# Patient Record
Sex: FEMALE | Race: WHITE | Employment: FULL TIME | URBAN - METROPOLITAN AREA
[De-identification: names, ages, dates, MRNs, and addresses within clinical notes are randomized per-mention and may not be internally consistent; named-entity substitution may affect disease eponyms.]

---

## 2017-02-16 RX ORDER — ESCITALOPRAM OXALATE 10 MG/1
10 TABLET ORAL DAILY
Qty: 30 TABLET | Refills: 2 | Status: SHIPPED | OUTPATIENT
Start: 2017-02-16 | End: 2017-06-08 | Stop reason: ALTCHOICE

## 2017-04-26 ENCOUNTER — HOSPITAL ENCOUNTER (EMERGENCY)
Age: 37
Discharge: HOME | End: 2017-04-26
Attending: EMERGENCY MEDICINE
Payer: COMMERCIAL

## 2017-04-26 ENCOUNTER — APPOINTMENT (OUTPATIENT)
Dept: GENERAL RADIOLOGY | Age: 37
End: 2017-04-26
Payer: COMMERCIAL

## 2017-04-26 VITALS
WEIGHT: 172 LBS | BODY MASS INDEX: 27.64 KG/M2 | SYSTOLIC BLOOD PRESSURE: 120 MMHG | HEIGHT: 66 IN | OXYGEN SATURATION: 96 % | RESPIRATION RATE: 16 BRPM | DIASTOLIC BLOOD PRESSURE: 68 MMHG | HEART RATE: 88 BPM

## 2017-04-26 DIAGNOSIS — S80.12XA CONTUSION OF CALF, LEFT, INITIAL ENCOUNTER: Primary | ICD-10-CM

## 2017-04-26 PROCEDURE — 99283 EMERGENCY DEPT VISIT LOW MDM: CPT

## 2017-04-26 PROCEDURE — 73590 X-RAY EXAM OF LOWER LEG: CPT

## 2017-04-26 RX ORDER — IBUPROFEN 800 MG/1
800 TABLET ORAL EVERY 8 HOURS PRN
Qty: 20 TABLET | Refills: 0 | Status: SHIPPED | OUTPATIENT
Start: 2017-04-26 | End: 2017-06-08 | Stop reason: SDUPTHER

## 2017-04-26 ASSESSMENT — PAIN DESCRIPTION - FREQUENCY: FREQUENCY: INTERMITTENT

## 2017-04-26 ASSESSMENT — PAIN DESCRIPTION - ORIENTATION: ORIENTATION: LEFT;MID

## 2017-04-26 ASSESSMENT — PAIN DESCRIPTION - DESCRIPTORS: DESCRIPTORS: POUNDING;PRESSURE

## 2017-04-26 ASSESSMENT — PAIN SCALES - GENERAL: PAINLEVEL_OUTOF10: 2

## 2017-04-26 ASSESSMENT — PAIN DESCRIPTION - LOCATION: LOCATION: LEG

## 2017-05-08 ENCOUNTER — TELEPHONE (OUTPATIENT)
Dept: OBGYN CLINIC | Age: 37
End: 2017-05-08

## 2017-05-08 DIAGNOSIS — N92.0 MENORRHAGIA WITH REGULAR CYCLE: Primary | ICD-10-CM

## 2017-06-08 ENCOUNTER — OFFICE VISIT (OUTPATIENT)
Dept: INTERNAL MEDICINE CLINIC | Age: 37
End: 2017-06-08
Payer: COMMERCIAL

## 2017-06-08 ENCOUNTER — HOSPITAL ENCOUNTER (OUTPATIENT)
Age: 37
Setting detail: SPECIMEN
Discharge: HOME | End: 2017-06-08
Payer: COMMERCIAL

## 2017-06-08 VITALS
TEMPERATURE: 98.6 F | HEIGHT: 66 IN | RESPIRATION RATE: 16 BRPM | SYSTOLIC BLOOD PRESSURE: 90 MMHG | HEART RATE: 80 BPM | BODY MASS INDEX: 27.16 KG/M2 | DIASTOLIC BLOOD PRESSURE: 70 MMHG | WEIGHT: 169 LBS

## 2017-06-08 DIAGNOSIS — R61 SWEATING INCREASE: Primary | ICD-10-CM

## 2017-06-08 DIAGNOSIS — N64.4 BREAST PAIN, RIGHT: ICD-10-CM

## 2017-06-08 DIAGNOSIS — E04.1 THYROID NODULE: ICD-10-CM

## 2017-06-08 DIAGNOSIS — Z00.00 PHYSICAL EXAM: ICD-10-CM

## 2017-06-08 DIAGNOSIS — R14.0 ABDOMINAL BLOATING: ICD-10-CM

## 2017-06-08 DIAGNOSIS — F41.1 GAD (GENERALIZED ANXIETY DISORDER): ICD-10-CM

## 2017-06-08 DIAGNOSIS — R61 SWEATING INCREASE: ICD-10-CM

## 2017-06-08 LAB
-: ABNORMAL
ALBUMIN SERPL-MCNC: 4.7 G/DL (ref 3.5–5.2)
ALBUMIN/GLOBULIN RATIO: 2 (ref 1–2.5)
ALP BLD-CCNC: 52 U/L (ref 35–104)
ALT SERPL-CCNC: 14 U/L (ref 5–33)
AMORPHOUS: ABNORMAL
ANION GAP SERPL CALCULATED.3IONS-SCNC: 16 MMOL/L (ref 9–17)
AST SERPL-CCNC: 19 U/L
BACTERIA: ABNORMAL
BILIRUB SERPL-MCNC: 0.84 MG/DL (ref 0.3–1.2)
BILIRUBIN URINE: NEGATIVE
BUN BLDV-MCNC: 14 MG/DL (ref 6–20)
BUN/CREAT BLD: NORMAL (ref 9–20)
CALCIUM SERPL-MCNC: 9.8 MG/DL (ref 8.6–10.4)
CASTS UA: ABNORMAL /LPF (ref 0–2)
CHLORIDE BLD-SCNC: 106 MMOL/L (ref 98–107)
CO2: 22 MMOL/L (ref 20–31)
COLOR: YELLOW
COMMENT UA: ABNORMAL
CREAT SERPL-MCNC: 0.75 MG/DL (ref 0.5–0.9)
CRYSTALS, UA: ABNORMAL /HPF
EPITHELIAL CELLS UA: ABNORMAL /HPF (ref 0–5)
GFR AFRICAN AMERICAN: >60 ML/MIN
GFR NON-AFRICAN AMERICAN: >60 ML/MIN
GFR SERPL CREATININE-BSD FRML MDRD: NORMAL ML/MIN/{1.73_M2}
GFR SERPL CREATININE-BSD FRML MDRD: NORMAL ML/MIN/{1.73_M2}
GLUCOSE BLD-MCNC: 82 MG/DL (ref 70–99)
GLUCOSE URINE: NEGATIVE
HCT VFR BLD CALC: 41.5 % (ref 36–46)
HEMOGLOBIN: 13.9 G/DL (ref 12–16)
KETONES, URINE: ABNORMAL
LEUKOCYTE ESTERASE, URINE: NEGATIVE
MCH RBC QN AUTO: 32.5 PG (ref 26–34)
MCHC RBC AUTO-ENTMCNC: 33.6 G/DL (ref 31–37)
MCV RBC AUTO: 96.8 FL (ref 80–100)
MUCUS: ABNORMAL
NITRITE, URINE: POSITIVE
OTHER OBSERVATIONS UA: ABNORMAL
PDW BLD-RTO: 14.6 % (ref 12.5–15.4)
PH UA: 5 (ref 5–8)
PLATELET # BLD: 217 K/UL (ref 140–450)
PMV BLD AUTO: 8.7 FL (ref 6–12)
POTASSIUM SERPL-SCNC: 4.5 MMOL/L (ref 3.7–5.3)
PROTEIN UA: NEGATIVE
RBC # BLD: 4.29 M/UL (ref 4–5.2)
RBC UA: ABNORMAL /HPF (ref 0–2)
RENAL EPITHELIAL, UA: ABNORMAL /HPF
RHEUMATOID FACTOR: <10 IU/ML
SODIUM BLD-SCNC: 144 MMOL/L (ref 135–144)
SPECIFIC GRAVITY UA: 1.03 (ref 1–1.03)
THYROXINE, FREE: 1.08 NG/DL (ref 0.93–1.7)
TOTAL PROTEIN: 7.1 G/DL (ref 6.4–8.3)
TRICHOMONAS: ABNORMAL
TSH SERPL DL<=0.05 MIU/L-ACNC: 3.1 MIU/L (ref 0.3–5)
TURBIDITY: CLEAR
URINE HGB: ABNORMAL
UROBILINOGEN, URINE: NORMAL
VITAMIN B-12: 498 PG/ML (ref 211–946)
WBC # BLD: 9.6 K/UL (ref 3.5–11)
WBC UA: ABNORMAL /HPF (ref 0–5)
YEAST: ABNORMAL

## 2017-06-08 PROCEDURE — 99213 OFFICE O/P EST LOW 20 MIN: CPT | Performed by: INTERNAL MEDICINE

## 2017-06-08 RX ORDER — IBUPROFEN 800 MG/1
800 TABLET ORAL EVERY 8 HOURS PRN
Qty: 90 TABLET | Refills: 0 | Status: SHIPPED | OUTPATIENT
Start: 2017-06-08 | End: 2017-09-18 | Stop reason: SDUPTHER

## 2017-06-09 ENCOUNTER — TELEPHONE (OUTPATIENT)
Dept: INTERNAL MEDICINE CLINIC | Age: 37
End: 2017-06-09

## 2017-06-09 DIAGNOSIS — N39.0 URINARY TRACT INFECTION, SITE UNSPECIFIED: Primary | ICD-10-CM

## 2017-06-09 LAB — ANTI-NUCLEAR ANTIBODY (ANA): ABNORMAL

## 2017-06-09 RX ORDER — CIPROFLOXACIN 500 MG/1
500 TABLET, FILM COATED ORAL 2 TIMES DAILY
Qty: 14 TABLET | Refills: 0 | Status: SHIPPED | OUTPATIENT
Start: 2017-06-09 | End: 2017-06-16

## 2017-06-10 LAB
CULTURE: ABNORMAL
CULTURE: ABNORMAL
Lab: ABNORMAL
ORGANISM: ABNORMAL
SPECIMEN DESCRIPTION: ABNORMAL
STATUS: ABNORMAL

## 2017-09-18 ENCOUNTER — OFFICE VISIT (OUTPATIENT)
Dept: INTERNAL MEDICINE CLINIC | Age: 37
End: 2017-09-18
Payer: COMMERCIAL

## 2017-09-18 ENCOUNTER — HOSPITAL ENCOUNTER (OUTPATIENT)
Age: 37
Setting detail: SPECIMEN
Discharge: HOME | End: 2017-09-18
Payer: COMMERCIAL

## 2017-09-18 VITALS
WEIGHT: 172 LBS | TEMPERATURE: 98.4 F | BODY MASS INDEX: 27.64 KG/M2 | SYSTOLIC BLOOD PRESSURE: 100 MMHG | HEIGHT: 66 IN | RESPIRATION RATE: 16 BRPM | DIASTOLIC BLOOD PRESSURE: 76 MMHG | HEART RATE: 72 BPM

## 2017-09-18 DIAGNOSIS — L65.9 HAIR LOSS: ICD-10-CM

## 2017-09-18 DIAGNOSIS — R61 SWEATING INCREASE: Primary | ICD-10-CM

## 2017-09-18 DIAGNOSIS — R76.8 POSITIVE ANA (ANTINUCLEAR ANTIBODY): ICD-10-CM

## 2017-09-18 DIAGNOSIS — R61 SWEATING INCREASE: ICD-10-CM

## 2017-09-18 DIAGNOSIS — R39.11 URINARY HESITANCY: ICD-10-CM

## 2017-09-18 PROCEDURE — 99214 OFFICE O/P EST MOD 30 MIN: CPT | Performed by: INTERNAL MEDICINE

## 2017-09-18 RX ORDER — IBUPROFEN 800 MG/1
800 TABLET ORAL EVERY 8 HOURS PRN
Qty: 90 TABLET | Refills: 0 | Status: SHIPPED | OUTPATIENT
Start: 2017-09-18 | End: 2018-02-16 | Stop reason: SDUPTHER

## 2017-09-18 ASSESSMENT — PATIENT HEALTH QUESTIONNAIRE - PHQ9
SUM OF ALL RESPONSES TO PHQ9 QUESTIONS 1 & 2: 0
1. LITTLE INTEREST OR PLEASURE IN DOING THINGS: 0
SUM OF ALL RESPONSES TO PHQ QUESTIONS 1-9: 0
2. FEELING DOWN, DEPRESSED OR HOPELESS: 0

## 2017-09-19 LAB
ANTI-NUCLEAR ANTIBODY (ANA): NEGATIVE
CULTURE: NO GROWTH
CULTURE: NORMAL
Lab: NORMAL
SPECIMEN DESCRIPTION: NORMAL
STATUS: NORMAL

## 2018-02-16 RX ORDER — IBUPROFEN 800 MG/1
800 TABLET ORAL EVERY 8 HOURS PRN
Qty: 90 TABLET | Refills: 0 | Status: SHIPPED | OUTPATIENT
Start: 2018-02-16 | End: 2018-09-28 | Stop reason: ALTCHOICE

## 2018-09-28 ENCOUNTER — HOSPITAL ENCOUNTER (EMERGENCY)
Age: 38
Discharge: HOME | End: 2018-09-28
Attending: EMERGENCY MEDICINE
Payer: COMMERCIAL

## 2018-09-28 ENCOUNTER — OFFICE VISIT (OUTPATIENT)
Dept: INTERNAL MEDICINE CLINIC | Age: 38
End: 2018-09-28
Payer: COMMERCIAL

## 2018-09-28 ENCOUNTER — TELEPHONE (OUTPATIENT)
Dept: INTERNAL MEDICINE CLINIC | Age: 38
End: 2018-09-28

## 2018-09-28 VITALS
BODY MASS INDEX: 26.68 KG/M2 | SYSTOLIC BLOOD PRESSURE: 141 MMHG | WEIGHT: 166 LBS | HEART RATE: 82 BPM | TEMPERATURE: 98.2 F | OXYGEN SATURATION: 100 % | HEIGHT: 66 IN | DIASTOLIC BLOOD PRESSURE: 86 MMHG | RESPIRATION RATE: 16 BRPM

## 2018-09-28 VITALS
DIASTOLIC BLOOD PRESSURE: 88 MMHG | HEART RATE: 103 BPM | SYSTOLIC BLOOD PRESSURE: 130 MMHG | BODY MASS INDEX: 26.81 KG/M2 | OXYGEN SATURATION: 98 % | WEIGHT: 166.8 LBS | HEIGHT: 66 IN

## 2018-09-28 DIAGNOSIS — M54.42 ACUTE LEFT-SIDED LOW BACK PAIN WITH LEFT-SIDED SCIATICA: Primary | ICD-10-CM

## 2018-09-28 DIAGNOSIS — M79.2 NEUROPATHIC PAIN OF ANKLE, LEFT: Primary | ICD-10-CM

## 2018-09-28 PROCEDURE — 99283 EMERGENCY DEPT VISIT LOW MDM: CPT

## 2018-09-28 PROCEDURE — G8419 CALC BMI OUT NRM PARAM NOF/U: HCPCS | Performed by: NURSE PRACTITIONER

## 2018-09-28 PROCEDURE — 4004F PT TOBACCO SCREEN RCVD TLK: CPT | Performed by: NURSE PRACTITIONER

## 2018-09-28 PROCEDURE — 84703 CHORIONIC GONADOTROPIN ASSAY: CPT

## 2018-09-28 PROCEDURE — 81003 URINALYSIS AUTO W/O SCOPE: CPT

## 2018-09-28 PROCEDURE — G8427 DOCREV CUR MEDS BY ELIG CLIN: HCPCS | Performed by: NURSE PRACTITIONER

## 2018-09-28 PROCEDURE — 99214 OFFICE O/P EST MOD 30 MIN: CPT | Performed by: NURSE PRACTITIONER

## 2018-09-28 RX ORDER — METHYLPREDNISOLONE SODIUM SUCCINATE 125 MG/2ML
125 INJECTION, POWDER, LYOPHILIZED, FOR SOLUTION INTRAMUSCULAR; INTRAVENOUS ONCE
Status: COMPLETED | OUTPATIENT
Start: 2018-09-28 | End: 2018-09-28

## 2018-09-28 RX ORDER — PREDNISONE 10 MG/1
10 TABLET ORAL
Qty: 15 TABLET | Refills: 0 | Status: SHIPPED | OUTPATIENT
Start: 2018-09-28 | End: 2018-10-16 | Stop reason: SDUPTHER

## 2018-09-28 RX ORDER — GABAPENTIN 300 MG/1
300 CAPSULE ORAL 2 TIMES DAILY
Qty: 30 CAPSULE | Refills: 0 | Status: SHIPPED | OUTPATIENT
Start: 2018-09-28 | End: 2018-10-23 | Stop reason: ALTCHOICE

## 2018-09-28 RX ADMIN — METHYLPREDNISOLONE SODIUM SUCCINATE 125 MG: 125 INJECTION, POWDER, LYOPHILIZED, FOR SOLUTION INTRAMUSCULAR; INTRAVENOUS at 15:19

## 2018-09-28 ASSESSMENT — PATIENT HEALTH QUESTIONNAIRE - PHQ9
SUM OF ALL RESPONSES TO PHQ QUESTIONS 1-9: 0
SUM OF ALL RESPONSES TO PHQ QUESTIONS 1-9: 0
2. FEELING DOWN, DEPRESSED OR HOPELESS: 0
1. LITTLE INTEREST OR PLEASURE IN DOING THINGS: 0
SUM OF ALL RESPONSES TO PHQ9 QUESTIONS 1 & 2: 0

## 2018-09-28 ASSESSMENT — PAIN DESCRIPTION - LOCATION: LOCATION: BACK;ANKLE

## 2018-09-28 ASSESSMENT — PAIN SCALES - GENERAL: PAINLEVEL_OUTOF10: 6

## 2018-09-28 ASSESSMENT — PAIN DESCRIPTION - DESCRIPTORS: DESCRIPTORS: SHARP

## 2018-09-28 ASSESSMENT — PAIN DESCRIPTION - PAIN TYPE: TYPE: ACUTE PAIN;CHRONIC PAIN

## 2018-09-28 ASSESSMENT — PAIN DESCRIPTION - ORIENTATION: ORIENTATION: LOWER;LEFT

## 2018-09-28 NOTE — ED PROVIDER NOTES
[address] [place] [address] ED  eMERGENCY dEPARTMENT eNCOUnter      Pt Name: Karis Davis  MRN: [id number]  [place] 1980  Date of evaluation: 9/28/2018  Provider: Celeste Schroeder MD    [address] [place] [address]       Chief Complaint   Patient presents with    Back Pain     low back pain  (states \"from nerve damage\")    Ankle Pain     lt ankle pain (states \"from nerve damage\")    Diarrhea     chronic past 3 months    Incontinence     bowel and bladder incontinence intermittent past week         HISTORY OF PRESENT ILLNESS   (Location/Symptom, Timing/Onset, Context/Setting, Quality, Duration, Modifying Factors, Severity)  Note limiting factors. Karis Davis is a 45 y.o. female who presents to the emergency department Complaining of sharp pins and needle like pain on the outer aspect of the distal left lower leg that woke her up at 3 this morning. Patient states she is very familiar with this kind of pain. She underwent lumbar laminectomy 8 years ago in the lower lumbar spine and has had 3 surgeries on her back so far because after her surgery she developed similar pain in her left leg and was told that she had a cyst growing at the site of the operation which had to be removed. Since then she has lost a fair amount of weight and no exercises regularly and states that the pain is only intermittent. Currently she is pain-free. The history is provided by the patient and medical records. Nursing Notes were reviewed. REVIEW OF SYSTEMS    (2-9 systems for level 4, 10 or more for level 5)     Review of Systems   All other systems reviewed and are negative. Except as noted above the remainder of the review of systems was reviewed and negative. PAST MEDICAL HISTORY     Past Medical History:   Diagnosis Date    ADHD (attention deficit hyperactivity disorder)     ASCUS on Pap smear 11/3/2008    HPV +    Chronic back pain     cyst on L5-S1several times post back surgery.      Depression     HSIL (high grade ankle, left          DISPOSITION/PLAN   DISPOSITION Decision To Discharge 09/28/2018 11:53:36 AM      PATIENT REFERRED TO:  MD Danica Grigsby 36  [address]  [phone]      AS SOON AS POSSIBLE      DISCHARGE MEDICATIONS:  Discharge Medication List as of 9/28/2018 11:55 AM      START taking these medications    Details   gabapentin (NEURONTIN) 300 MG capsule Take 1 capsule by mouth 2 times daily for 15 days. Start with one cap once daily for 2 days, then one cap twice daily thereafter. ., Disp-30 capsule, R-0Print                Problem List:  Patient Active Problem List   Diagnosis Code    Depression F32.9    HSIL on Pap smear of cervix R87.613    ASCUS on Pap smear R89.6    Vaginal discharge N89.8    Dyspareunia [id number]    Dysmenorrhea N94.6    Pelvic pain in female R10.2    BV (bacterial vaginosis) N76.0, B96.89    Constipation K59.00    Nausea R11.0    Weight gain R63.5           Summation      Patient Course:  Stable. Discharged. ED Medications administered this visit:  Medications - No data to display    New Prescriptions from this visit:    Discharge Medication List as of 9/28/2018 11:55 AM      START taking these medications    Details   gabapentin (NEURONTIN) 300 MG capsule Take 1 capsule by mouth 2 times daily for 15 days. Start with one cap once daily for 2 days, then one cap twice daily thereafter. ., Disp-30 capsule, R-0Print             Follow-up:  MD Danica Grigsby [address]  [address]      AS SOON AS POSSIBLE        Final Impression:   1.  Neuropathic pain of ankle, left               (Please note that portions of this note were completed with a voice recognition program.  Efforts were made to edit the dictations but occasionally words are mis-transcribed.)    Tasneem Espinoza MD (electronically signed)  Attending Emergency Physician            Tasneem Espinoza MD  09/28/18 1324 [facility]

## 2018-09-28 NOTE — PROGRESS NOTES
area    CYST REMOVAL  7/2011    from back L5-S1    OTHER SURGICAL HISTORY  5/2008    cervical cone by laser ablation    TUBAL LIGATION  2002     Family History   Problem Relation Age of Onset    Cancer Maternal Grandmother         throat and lung    Thyroid Disease Maternal Grandmother     Heart Disease Maternal Grandfather     Breast Cancer Mother     Thyroid Disease Mother     Breast Cancer Paternal Aunt     No Known Problems Father     Diabetes Paternal Grandfather     Cancer Paternal Grandfather         lung    Cancer Maternal Aunt         kidney    Thyroid Disease Maternal Aunt     Cancer Paternal Aunt         stomach     Social History   Substance Use Topics    Smoking status: Current Every Day Smoker     Packs/day: 0.50    Smokeless tobacco: Never Used    Alcohol use 0.0 oz/week     1 Standard drinks or equivalent per week      Comment: rarely     ALLERGIES:    Allergies   Allergen Reactions    Darvocet-N 100 [Propoxyphene N-Acetaminophen]      Unsure of reaction          Subjective     · Constitutional:  Negative for activity change, appetite change, chills, fatigue, fever and unexpected weight change. · HENT: Negative for congestion, ear pain, rhinorrhea, sinus pain, sinus pressure and sore throat. · Eyes:  Negative for pain and discharge. · Respiratory:  Negative for cough, chest tightness, shortness of breath and wheezing. · Cardiovascular:  Negative for chest pain, palpitations and leg swelling. · Gastrointestinal: Negative for abdominal pain, blood in stool, constipation,diarrhea, nausea and vomiting. · Endocrine: Negative for cold intolerance, heat intolerance, polydipsia, polyphagia and polyuria. · Genitourinary: Negative for difficulty urinating, dysuria, flank pain, frequency, hematuria and urgency. · Musculoskeletal: Negative for arthralgias, joint swelling, myalgias, neck pain and neck stiffness.  Positive for low back pain with left leg radiation  · Skin: WNL  · Lymphadenopathy: No cervical lymphadenopathy noted. · Neurological: Alert and oriented to person, place, and time. Normal motor function, Normal sensory function, No focal deficits noted. He has normal strength. · Skin: Skin is warm, dry and intact. No obvious lesions on exposed skin  · Psychiatric: Normal mood and affect. Speech is normal and behavior is normal.     · Nursing note and vitals reviewed. Blood pressure 130/88, pulse 103, height 5' 5.98\" (1.676 m), weight 166 lb 12.8 oz (75.7 kg), last menstrual period 09/04/2018, SpO2 98 %, not currently breastfeeding. Body mass index is 26.94 kg/m². Wt Readings from Last 3 Encounters:   09/28/18 166 lb 12.8 oz (75.7 kg)   09/28/18 166 lb (75.3 kg)   09/18/17 172 lb (78 kg)     BP Readings from Last 3 Encounters:   09/28/18 130/88   09/28/18 (!) 141/86   09/18/17 100/76       Assessment       1. Acute left-sided low back pain with left-sided sciatica  - methylPREDNISolone sodium (SOLU-MEDROL) injection 125 mg; Inject 2 mLs into the muscle once  - predniSONE (DELTASONE) 10 MG tablet; Take 1 tablet by mouth 3 times daily (with meals) for 5 days  Dispense: 15 tablet; Refill: 0  - [facility]        Plan/Medical Decision Making     Lynette Larson was seen in clinic today for exacerbation of low back pain. · Lynette Larson has a long standing hx of back pain. However, her symptoms have been completely controlled over the past several years. She reports increasing her physical activity and losing weight to improve her symptoms which has worked up until last night. She reports that she worked harder during her work out and I feel this may be a contributing factor to this flair up of pain. · SoluMedrol injection today  · Prednisone 10mg 3 times daily for 5 days starting tomorrow.   · She is to f/u if her symptoms become worse or fail to improve      Return in about 2 weeks (around 10/12/2018), or if symptoms worsen or fail to improve, for acute on chronic low back pain with radiation to left leg. 1.  Laura received counseling on the following healthy behaviors: nutrition, exercise and medication adherence  2. Patient given educational materials - see patient instructions  3. Was a self-tracking handout given in paper form or via [employer]? No  If yes, see orders or list here. 4.  Discussed use, benefit, and side effects of prescribed medications. Barriers to medication compliance addressed. All patient questions answered. Pt voiced understanding. 5.  Reviewed prior labs, imaging, consultation, follow up, and health maintenance  6. Continue current medications, diet and exercise. 7. Discussed use, benefit, and side effects of prescribed medications. Barriers to medication compliance addressed. All her questions were answered. Pt voiced understanding. Sydell Signs will continue current medications, diet and exercise. Completed Orders/Prescriptions   Orders Placed This Encounter   Medications    methylPREDNISolone sodium (SOLU-MEDROL) injection 125 mg    predniSONE (DELTASONE) 10 MG tablet     Sig: Take 1 tablet by mouth 3 times daily (with meals) for 5 days     Dispense:  15 tablet     Refill:  0       Administrations This Visit     methylPREDNISolone sodium (SOLU-MEDROL) injection 125 mg     Admin Date  09/28/2018  15:19 Action  Given Dose  125 mg Route  Intramuscular Site  Ventrogluteal Right Administered By  Jenny Cruz MA    Ordering Provider:  LYNDSAY Gomez CNP    NDC:  0613-2150-36    Lot#:  [id number]    :  Gale Ray. Patient Supplied?:  No    Comments:  EXP:12/1/2020                  Patient given educational materials on muscle strain/sprain    Of the 25 minute duration appointment visit, Eric Mobley CNP spent at least 50% of the face-to-face time in counseling, explanation of diagnosis, planning of further management, and answering all questions.     Signed:  Eric Mobley CNP    This note is created with

## 2018-09-28 NOTE — TELEPHONE ENCOUNTER
Patient last seen 9/18/17     Calling c/o nerve pain in left foot and back states it is the same nerve pain that she had when she had surgery.  Please advise

## 2018-09-29 LAB — HCG, PREGNANCY URINE (POC): NEGATIVE

## 2018-10-11 RX ORDER — IBUPROFEN 800 MG/1
800 TABLET ORAL EVERY 8 HOURS PRN
Qty: 30 TABLET | Refills: 0 | Status: SHIPPED | OUTPATIENT
Start: 2018-10-11 | End: 2018-10-23 | Stop reason: ALTCHOICE

## 2018-10-12 ENCOUNTER — APPOINTMENT (OUTPATIENT)
Dept: GENERAL RADIOLOGY | Age: 38
End: 2018-10-12
Payer: COMMERCIAL

## 2018-10-12 ENCOUNTER — HOSPITAL ENCOUNTER (EMERGENCY)
Age: 38
Discharge: HOME | End: 2018-10-12
Attending: EMERGENCY MEDICINE
Payer: COMMERCIAL

## 2018-10-12 VITALS
SYSTOLIC BLOOD PRESSURE: 117 MMHG | WEIGHT: 166 LBS | OXYGEN SATURATION: 100 % | HEART RATE: 66 BPM | DIASTOLIC BLOOD PRESSURE: 77 MMHG | TEMPERATURE: 99 F | HEIGHT: 65 IN | BODY MASS INDEX: 27.66 KG/M2 | RESPIRATION RATE: 16 BRPM

## 2018-10-12 DIAGNOSIS — M54.16 LUMBAR RADICULOPATHY: ICD-10-CM

## 2018-10-12 DIAGNOSIS — G89.29 ACUTE EXACERBATION OF CHRONIC LOW BACK PAIN: Primary | ICD-10-CM

## 2018-10-12 DIAGNOSIS — M54.50 ACUTE EXACERBATION OF CHRONIC LOW BACK PAIN: Primary | ICD-10-CM

## 2018-10-12 PROCEDURE — 99283 EMERGENCY DEPT VISIT LOW MDM: CPT

## 2018-10-12 PROCEDURE — 96372 THER/PROPH/DIAG INJ SC/IM: CPT

## 2018-10-12 PROCEDURE — 72100 X-RAY EXAM L-S SPINE 2/3 VWS: CPT

## 2018-10-12 PROCEDURE — 6360000002 HC RX W HCPCS: Performed by: PHYSICIAN ASSISTANT

## 2018-10-12 RX ORDER — ETODOLAC 500 MG/1
500 TABLET, FILM COATED ORAL 2 TIMES DAILY
Qty: 60 TABLET | Refills: 3 | Status: SHIPPED | OUTPATIENT
Start: 2018-10-12 | End: 2018-10-23

## 2018-10-12 RX ORDER — ONDANSETRON 4 MG/1
4 TABLET, ORALLY DISINTEGRATING ORAL ONCE
Status: COMPLETED | OUTPATIENT
Start: 2018-10-12 | End: 2018-10-12

## 2018-10-12 RX ORDER — HYDROCODONE BITARTRATE AND ACETAMINOPHEN 5; 325 MG/1; MG/1
1 TABLET ORAL 3 TIMES DAILY
Qty: 15 TABLET | Refills: 0 | Status: SHIPPED | OUTPATIENT
Start: 2018-10-12 | End: 2018-10-17

## 2018-10-12 RX ORDER — MORPHINE SULFATE 4 MG/ML
4 INJECTION, SOLUTION INTRAMUSCULAR; INTRAVENOUS ONCE
Status: COMPLETED | OUTPATIENT
Start: 2018-10-12 | End: 2018-10-12

## 2018-10-12 RX ORDER — METHOCARBAMOL 750 MG/1
750 TABLET, FILM COATED ORAL 4 TIMES DAILY
Qty: 40 TABLET | Refills: 0 | Status: SHIPPED | OUTPATIENT
Start: 2018-10-12 | End: 2018-10-22

## 2018-10-12 RX ORDER — KETOROLAC TROMETHAMINE 30 MG/ML
60 INJECTION, SOLUTION INTRAMUSCULAR; INTRAVENOUS ONCE
Status: COMPLETED | OUTPATIENT
Start: 2018-10-12 | End: 2018-10-12

## 2018-10-12 RX ADMIN — ONDANSETRON 4 MG: 4 TABLET, ORALLY DISINTEGRATING ORAL at 13:22

## 2018-10-12 RX ADMIN — KETOROLAC TROMETHAMINE 60 MG: 30 INJECTION, SOLUTION INTRAMUSCULAR at 13:22

## 2018-10-12 RX ADMIN — MORPHINE SULFATE 4 MG: 4 INJECTION INTRAVENOUS at 13:22

## 2018-10-12 ASSESSMENT — PAIN DESCRIPTION - PAIN TYPE: TYPE: CHRONIC PAIN;ACUTE PAIN

## 2018-10-12 ASSESSMENT — PAIN DESCRIPTION - LOCATION: LOCATION: BACK

## 2018-10-12 ASSESSMENT — PAIN DESCRIPTION - DESCRIPTORS: DESCRIPTORS: SHARP;STABBING;ACHING

## 2018-10-12 ASSESSMENT — PAIN SCALES - GENERAL
PAINLEVEL_OUTOF10: 10
PAINLEVEL_OUTOF10: 10

## 2018-10-12 ASSESSMENT — PAIN DESCRIPTION - ORIENTATION: ORIENTATION: LOWER

## 2018-10-12 NOTE — ED PROVIDER NOTES
The patient was seen and examined by me in conjunction with the mid-level provider. I agree with his/her assessment and treatment plan. X-rays per radiology showed no acute findings and she is being provided pain medication.      Vicky Pacheco MD  10/12/18 7505
MD  [address] [facility]  [phone]            DISCHARGE MEDICATIONS:     New Prescriptions    ETODOLAC (LODINE) 500 MG TABLET    Take 1 tablet by mouth 2 times daily    HYDROCODONE-ACETAMINOPHEN (NORCO) 5-325 MG PER TABLET    Take 1 tablet by mouth 3 times daily for 5 days. Judythe Leo     METHOCARBAMOL (ROBAXIN-750) 750 MG TABLET    Take 1 tablet by mouth 4 times daily for 10 days           (Please note that portions of this note were completed with a voice recognition program.  Efforts were made to edit thedictations but occasionally words are mis-transcribed.)    SETH Andrews PA-C  10/12/18 3169

## 2018-10-15 ENCOUNTER — TELEPHONE (OUTPATIENT)
Dept: INTERNAL MEDICINE CLINIC | Age: 38
End: 2018-10-15

## 2018-10-16 ENCOUNTER — OFFICE VISIT (OUTPATIENT)
Dept: INTERNAL MEDICINE CLINIC | Age: 38
End: 2018-10-16
Payer: COMMERCIAL

## 2018-10-16 ENCOUNTER — HOSPITAL ENCOUNTER (OUTPATIENT)
Dept: PHYSICAL THERAPY | Facility: CLINIC | Age: 38
Setting detail: THERAPIES SERIES
Discharge: HOME | End: 2018-10-16
Payer: COMMERCIAL

## 2018-10-16 VITALS
RESPIRATION RATE: 16 BRPM | SYSTOLIC BLOOD PRESSURE: 130 MMHG | DIASTOLIC BLOOD PRESSURE: 70 MMHG | BODY MASS INDEX: 28.19 KG/M2 | TEMPERATURE: 98.7 F | HEART RATE: 88 BPM | WEIGHT: 169.2 LBS | OXYGEN SATURATION: 94 % | HEIGHT: 65 IN

## 2018-10-16 DIAGNOSIS — M54.42 ACUTE LEFT-SIDED LOW BACK PAIN WITH LEFT-SIDED SCIATICA: ICD-10-CM

## 2018-10-16 PROCEDURE — 99213 OFFICE O/P EST LOW 20 MIN: CPT | Performed by: INTERNAL MEDICINE

## 2018-10-16 PROCEDURE — G8419 CALC BMI OUT NRM PARAM NOF/U: HCPCS | Performed by: INTERNAL MEDICINE

## 2018-10-16 PROCEDURE — 4004F PT TOBACCO SCREEN RCVD TLK: CPT | Performed by: INTERNAL MEDICINE

## 2018-10-16 PROCEDURE — G8484 FLU IMMUNIZE NO ADMIN: HCPCS | Performed by: INTERNAL MEDICINE

## 2018-10-16 PROCEDURE — G8427 DOCREV CUR MEDS BY ELIG CLIN: HCPCS | Performed by: INTERNAL MEDICINE

## 2018-10-16 RX ORDER — METHYLPREDNISOLONE SODIUM SUCCINATE 125 MG/2ML
125 INJECTION, POWDER, LYOPHILIZED, FOR SOLUTION INTRAMUSCULAR; INTRAVENOUS ONCE
Status: COMPLETED | OUTPATIENT
Start: 2018-10-16 | End: 2018-10-16

## 2018-10-16 RX ORDER — PREDNISONE 10 MG/1
10 TABLET ORAL
Qty: 15 TABLET | Refills: 0 | Status: SHIPPED | OUTPATIENT
Start: 2018-10-16 | End: 2018-10-21

## 2018-10-16 RX ADMIN — METHYLPREDNISOLONE SODIUM SUCCINATE 125 MG: 125 INJECTION, POWDER, LYOPHILIZED, FOR SOLUTION INTRAMUSCULAR; INTRAVENOUS at 14:22

## 2018-10-23 ENCOUNTER — HOSPITAL ENCOUNTER (OUTPATIENT)
Dept: PAIN MANAGEMENT | Age: 38
Discharge: HOME | End: 2018-10-23
Payer: COMMERCIAL

## 2018-10-23 VITALS
BODY MASS INDEX: 28.16 KG/M2 | RESPIRATION RATE: 16 BRPM | OXYGEN SATURATION: 99 % | HEIGHT: 65 IN | TEMPERATURE: 98.4 F | DIASTOLIC BLOOD PRESSURE: 67 MMHG | HEART RATE: 68 BPM | WEIGHT: 169 LBS | SYSTOLIC BLOOD PRESSURE: 127 MMHG

## 2018-10-23 DIAGNOSIS — R20.0 LEFT LEG NUMBNESS: Chronic | ICD-10-CM

## 2018-10-23 DIAGNOSIS — Z98.890 HISTORY OF LUMBAR SURGERY: Chronic | ICD-10-CM

## 2018-10-23 DIAGNOSIS — M54.50 CHRONIC BILATERAL LOW BACK PAIN WITHOUT SCIATICA: Chronic | ICD-10-CM

## 2018-10-23 DIAGNOSIS — G89.29 CHRONIC BILATERAL LOW BACK PAIN WITHOUT SCIATICA: Chronic | ICD-10-CM

## 2018-10-23 PROCEDURE — 99244 OFF/OP CNSLTJ NEW/EST MOD 40: CPT | Performed by: ANESTHESIOLOGY

## 2018-10-23 PROCEDURE — 99203 OFFICE O/P NEW LOW 30 MIN: CPT

## 2018-10-23 RX ORDER — ETODOLAC 500 MG/1
500 TABLET, FILM COATED ORAL PRN
COMMUNITY
End: 2019-05-06

## 2018-10-23 RX ORDER — METHOCARBAMOL 750 MG/1
750 TABLET, FILM COATED ORAL PRN
COMMUNITY
End: 2019-05-06

## 2018-10-23 ASSESSMENT — ENCOUNTER SYMPTOMS
DIARRHEA: 1
CHEST TIGHTNESS: 0
ABDOMINAL DISTENTION: 1
SHORTNESS OF BREATH: 0
BACK PAIN: 1
WHEEZING: 0

## 2018-10-23 ASSESSMENT — PAIN DESCRIPTION - PROGRESSION: CLINICAL_PROGRESSION: GRADUALLY WORSENING

## 2018-10-23 ASSESSMENT — PAIN DESCRIPTION - ORIENTATION: ORIENTATION: LEFT;LOWER

## 2018-10-23 ASSESSMENT — PAIN DESCRIPTION - PAIN TYPE: TYPE: CHRONIC PAIN

## 2018-10-23 ASSESSMENT — PAIN DESCRIPTION - FREQUENCY: FREQUENCY: CONTINUOUS

## 2018-10-23 ASSESSMENT — PAIN SCALES - GENERAL: PAINLEVEL_OUTOF10: 3

## 2018-10-23 ASSESSMENT — PAIN DESCRIPTION - LOCATION: LOCATION: BACK;BUTTOCKS;LEG

## 2018-10-23 NOTE — PROGRESS NOTES
[facility]. [facility] Pain Management  Patient Pain Assessment  Consultation  Primary Care Physician: Andrea Calvillo MD    Chief complaint:   Chief Complaint   Patient presents with    Back Pain   . HISTORY OF PRESENT ILLNESS:  Hector Padilla is 45 y.o. female with    HPI       This is a 44-year-old woman with history of chronic back pain. She had 3 back surgeries in 2010. Her first surgery was complicated by sudden development of left leg weakness pain and numbness. That led to the second and the third surgery. She was identified for the development of a large synovial cyst in the facet joints compressing the exiting nerve root. Over years her symptoms have been well managed. She had developed persistent numbness and pain sharp shooting-like sensation in her left lower leg radiating down from knee over outer calf to the top of the foot and big toe. Back pain improved after surgery over years with her physical activity. She managed to lose a lot of weight also. She has been physically very active playing tennis and going to gym regularly. Recently she developed exacerbation of her back pain after excessive physical activity when she ran on treadmill on a high degree of inclination. Pain is located in the lumbosacral area across midline affecting both sides over gluteal region. No radiation of pain in hips and groin or legs. No associated dermatomal numbness or paresthesia. She has been treated with oral steroid course that has calmed down her symptoms significantly. She will be starting physical therapy this Thursday.     Current Pain Assessment  Pain Assessment  Pain Assessment: 0-10  Pain Level: 3  Pain Type: Chronic pain  Pain Location: Back, Buttocks, Leg  Pain Orientation: Left, Lower  Pain Radiating Towards: low back and buttock and lower left leg   Pain Descriptors: Constant, Aching, Sharp, Spasm, Stabbing  Pain Frequency: Continuous  Clinical Progression: Gradually midline affecting both sides over gluteal region. No radiation of pain in hips and groin or legs. No associated dermatomal numbness or paresthesia. She has been treated with oral steroid course that has calmed down her symptoms significantly. She will be starting physical therapy this Thursday. 1. Left leg numbness    2. Chronic bilateral low back pain without sciatica    3. History of lumbar surgery      Symptoms are currently managed well after the recent steroid course  Advised patient to complete physical therapy  If symptoms persist after therapy she will call us to set up a follow-up appointment  May consider for new diagnostic imaging if symptoms persist and appropriate interventional procedure after review of the diagnostic imaging. She is currently taking Robaxin, etodolac and gabapentin. She should continue these medications. This note was created using voice recognition software. There may be inaccuracies of transcription  that are inadvertently overlooked prior to the signature. There is anyquestions about the transcription please contact me.

## 2018-10-25 ENCOUNTER — HOSPITAL ENCOUNTER (OUTPATIENT)
Dept: PHYSICAL THERAPY | Facility: CLINIC | Age: 38
Setting detail: THERAPIES SERIES
Discharge: HOME | End: 2018-10-25
Payer: COMMERCIAL

## 2018-10-25 DIAGNOSIS — Z98.890 HISTORY OF LUMBAR SURGERY: ICD-10-CM

## 2018-10-25 DIAGNOSIS — R20.0 LEFT LEG NUMBNESS: ICD-10-CM

## 2018-10-25 DIAGNOSIS — M54.50 CHRONIC BILATERAL LOW BACK PAIN WITHOUT SCIATICA: ICD-10-CM

## 2018-10-25 DIAGNOSIS — G89.29 CHRONIC BILATERAL LOW BACK PAIN WITHOUT SCIATICA: ICD-10-CM

## 2018-10-25 PROCEDURE — 97110 THERAPEUTIC EXERCISES: CPT

## 2018-10-25 PROCEDURE — 97530 THERAPEUTIC ACTIVITIES: CPT

## 2018-10-25 PROCEDURE — 97162 PT EVAL MOD COMPLEX 30 MIN: CPT

## 2018-10-25 NOTE — PLAN OF CARE
month and reports symptomatic relief with use, but now that she is off steroids notices increased pain. Patient also reports that she has been performing flexion and extension based exercises at home. Patient also with complaints of R-sided cervical neck pain with R cervical rotation, but believes this is related to working out. Assessment:  Problems:    [x] ? Back Pain: 2-8/10 LBP with radicular symptoms into RLE                          [x] ? Cervical Pain: R UT/levator tightness, pain with R rotation and side-bending        [x] ? ROM: mild limitations in lumbar ROM                      [x] ? Strength: mild strength deficits at bilateral hip mm   [x] ? Function: 38% perceived deficit per Oswestry; poor sleep quality; pain with donning/doffing shoes/socks   STG: (to be met in 8 treatments)  1. ? Pain: Patient will report pain at or below 5/10 at rest to promote improved tolerance to lying positions to promote improved ability to sleep with minimal positional changes. 2. ? ROM: Patient will demonstrate 5 degree improvement in lumbar extension ROM to promote ability to achieve terminal lumbar extension with prone progression exercises. 3. ? Strength: Patient will demonstrate ability to perform TrA contraction as palpated by therapist without verbal or tactile cueing to promote improved lumbar stabilization. 4. ? Function: Patient will report ability to sleep 4 hours at a time without waking up due to low back pain and needed to reposition to promote improved sleep quality. 5. Patient will report 25% reduction in frequency, intensity, and duration of symptoms into LE's to promote improved tolerance to functional mobility. 6. Independent with Home Exercise Programs  7. Demonstrate Knowledge of fall prevention- N/A  LTG: (to be met in 16 treatments)  1. Patient will report low back pain at or below 2/10 at during average work day to promote improved quality of life.    2. Patient will report 50% reduction symptoms into LE's and cervical spine pain. Electronically signed by: Coedy Dugan PT        Physician Signature:________________________________Date:__________________  By signing above or cosigning this note, I have reviewed this plan of care and certify a need for medically necessary rehabilitation services.      *PLEASE SIGN ABOVE AND FAX BACK ALL PAGES*

## 2018-10-30 ENCOUNTER — HOSPITAL ENCOUNTER (OUTPATIENT)
Dept: PHYSICAL THERAPY | Facility: CLINIC | Age: 38
Setting detail: THERAPIES SERIES
Discharge: HOME | End: 2018-10-30
Payer: COMMERCIAL

## 2018-10-30 NOTE — FLOWSHEET NOTE
[] [address]  [address]    [phone]  F: [phone] [x] [address] [facility] [address]   [address]  P: [phone]  F: [phone] [] [address]  [address] [facility]  P: [phone]  F: [phone] [] [address] [facility]   [address]   [place]: [phone]  F: [phone] [] [place] [address] [facility] [address]  [place]: [phone]   F: [phone]      Physical Therapy Cancel/No Show note    Date: 10/30/2018  Patient: Kumar Meier  : 1980  MRN: [id number]    Cancels/No Shows to date: 0    For today's appointment patient:  [x]  Cancelled  []  Rescheduled appointment  []  No-show     Reason given by patient:  []  Patient ill  []  Conflicting appointment  []  No transportation    []  Conflict with work  []  No reason given  []  Weather related  [x]  Other:   Sick child   Comments:      [x]  Next appointment was confirmed    Electronically signed by: Jess Bai PTA

## 2018-11-01 ENCOUNTER — HOSPITAL ENCOUNTER (OUTPATIENT)
Dept: PHYSICAL THERAPY | Facility: CLINIC | Age: 38
Setting detail: THERAPIES SERIES
Discharge: HOME | End: 2018-11-01
Payer: COMMERCIAL

## 2018-11-01 PROCEDURE — 97110 THERAPEUTIC EXERCISES: CPT

## 2018-12-14 ENCOUNTER — HOSPITAL ENCOUNTER (OUTPATIENT)
Dept: PHYSICAL THERAPY | Facility: CLINIC | Age: 38
Setting detail: THERAPIES SERIES
Discharge: HOME | End: 2018-12-14
Payer: COMMERCIAL

## 2019-05-06 ENCOUNTER — APPOINTMENT (OUTPATIENT)
Dept: GENERAL RADIOLOGY | Age: 39
End: 2019-05-06

## 2019-05-06 ENCOUNTER — HOSPITAL ENCOUNTER (EMERGENCY)
Age: 39
Discharge: HOME | End: 2019-05-06
Attending: EMERGENCY MEDICINE

## 2019-05-06 VITALS
HEART RATE: 87 BPM | SYSTOLIC BLOOD PRESSURE: 126 MMHG | RESPIRATION RATE: 20 BRPM | BODY MASS INDEX: 28.28 KG/M2 | HEIGHT: 66 IN | TEMPERATURE: 98.2 F | OXYGEN SATURATION: 100 % | WEIGHT: 176 LBS | DIASTOLIC BLOOD PRESSURE: 60 MMHG

## 2019-05-06 DIAGNOSIS — M25.561 ACUTE PAIN OF RIGHT KNEE: Primary | ICD-10-CM

## 2019-05-06 PROCEDURE — 73562 X-RAY EXAM OF KNEE 3: CPT

## 2019-05-06 PROCEDURE — 99283 EMERGENCY DEPT VISIT LOW MDM: CPT

## 2019-05-06 RX ORDER — IBUPROFEN 800 MG/1
800 TABLET ORAL EVERY 8 HOURS PRN
Qty: 20 TABLET | Refills: 0 | Status: SHIPPED | OUTPATIENT
Start: 2019-05-06 | End: 2021-05-26

## 2019-05-06 ASSESSMENT — PAIN DESCRIPTION - ORIENTATION: ORIENTATION: RIGHT

## 2019-05-06 ASSESSMENT — ENCOUNTER SYMPTOMS
VOMITING: 0
DIARRHEA: 0
ABDOMINAL PAIN: 0
COLOR CHANGE: 0
COUGH: 0
EYE DISCHARGE: 0
FACIAL SWELLING: 0
EYE REDNESS: 0
CONSTIPATION: 0
SHORTNESS OF BREATH: 0

## 2019-05-06 ASSESSMENT — PAIN SCALES - GENERAL: PAINLEVEL_OUTOF10: 3

## 2019-05-06 ASSESSMENT — PAIN DESCRIPTION - FREQUENCY: FREQUENCY: CONTINUOUS

## 2019-05-06 ASSESSMENT — PAIN DESCRIPTION - LOCATION: LOCATION: KNEE

## 2019-05-06 ASSESSMENT — PAIN DESCRIPTION - PAIN TYPE: TYPE: ACUTE PAIN

## 2019-05-06 ASSESSMENT — PAIN DESCRIPTION - ONSET: ONSET: ON-GOING

## 2019-05-06 ASSESSMENT — PAIN DESCRIPTION - DESCRIPTORS: DESCRIPTORS: CONSTANT

## 2019-05-06 NOTE — ED PROVIDER NOTES
[facility] ED  eMERGENCY dEPARTMENT eNCOUnter      Pt Name: Filemon Downs  MRN: [id number]  [place] 1980  Date of evaluation: 5/6/2019  Provider: Yosef Coker MD    [address] [place] [address]       Chief Complaint   Patient presents with    Knee Pain     right         HISTORY OF PRESENT ILLNESS  (Location/Symptom, Timing/Onset, Context/Setting, Quality, Duration, Modifying Factors, Severity.)   Filemon Downs is a 45 y.o. female who presents to the emergency department for pain to the right knee. It started almost 3 weeks ago after playing tennis but there was no specific injury such as a twist or fall. It has been swollen since then it hurts and it feels unstable to her. Pain is a 3 and it's a generalized and continuous. Nursing Notes were reviewed. ALLERGIES     Darvocet-n 100 [propoxyphene n-acetaminophen]    CURRENT MEDICATIONS       Previous Medications    NICOTINE POLACRILEX (THRIVE MT)    Take by mouth       PAST MEDICAL HISTORY         Diagnosis Date    ADHD (attention deficit hyperactivity disorder)     ASCUS on Pap smear 11/3/2008    HPV +    Chronic back pain     cyst on L5-S1several times post back surgery.  / on 10/12/18 pt denies pain mgmnt    Depression     HSIL (high grade squamous intraepithelial lesion) on Pap smear 3/25/2008    moderate & severe dysplasia, CIS/NARCISO 2 & 3    Neuropathy     leg and toe from back surgery    PVD (peripheral vascular disease) ([facility] [address]        SURGICAL HISTORY           Procedure Laterality Date    ABDOMEN SURGERY  02/20/2014    exp.  lap with KAL    BACK SURGERY  1/28/10    L5-S1    COLPOSCOPY  4/2008    CYST REMOVAL  4/23/10    lumbar area    CYST REMOVAL  7/2011    from back L5-S1    OTHER SURGICAL HISTORY  5/2008    cervical cone by laser ablation    TUBAL LIGATION  2002         FAMILY HISTORY           Problem Relation Age of Onset    Cancer Maternal Grandmother         throat and lung    Thyroid Disease Maternal Grandmother     Heart Disease Maternal Grandfather     Breast Cancer Mother     Thyroid Disease Mother     Breast Cancer Paternal Aunt     No Known Problems Father     Diabetes Paternal Grandfather     Cancer Paternal Grandfather         lung    Cancer Maternal Aunt         kidney    Thyroid Disease Maternal Aunt     Cancer Paternal Aunt         stomach     Family Status   Relation Name Status    MGM      MGF      Mother  Alive    PAunt      Father  Alive    PGF  (Not Specified)   [facility]  (Not Specified)    PAunt          SOCIAL HISTORY      reports that she has been smoking cigarettes. She has been smoking about 0.50 packs per day. She has never used smokeless tobacco. She reports that she drank alcohol. She reports that she does not use drugs. REVIEW OF SYSTEMS    (2-9 systems for level 4, 10 or more for level 5)     Review of Systems   Constitutional: Negative for chills, fatigue and fever. HENT: Negative for congestion, ear discharge and facial swelling. Eyes: Negative for discharge and redness. Respiratory: Negative for cough and shortness of breath. Cardiovascular: Negative for chest pain. Gastrointestinal: Negative for abdominal pain, constipation, diarrhea and vomiting. Genitourinary: Negative for dysuria and hematuria. Musculoskeletal: Negative for arthralgias. Skin: Negative for color change and rash. Neurological: Negative for syncope, numbness and headaches. Hematological: Negative for adenopathy. Psychiatric/Behavioral: Negative for confusion. The patient is not nervous/anxious. Except as noted above the remainder of the review of systems was reviewed and negative.      PHYSICAL EXAM    (up to 7 for level 4, 8 or more for level 5)     Vitals:    19 1051   BP: 126/60   Pulse: 87   Resp: 20   Temp: 98.2 °F (36.8 °C)   TempSrc: Oral   SpO2: 100%   Weight: 176 lb (79.8 kg)   Height: 5' 6\" (1.676 m)       Physical Exam   Constitutional: She is oriented to person, place, and time. She appears well-developed and well-nourished. No distress. HENT:   Head: Normocephalic and atraumatic. Eyes: Right eye exhibits no discharge. Left eye exhibits no discharge. No scleral icterus. Neck: Neck supple. Cardiovascular: Normal rate and regular rhythm. Pulmonary/Chest: Effort normal and breath sounds normal. No stridor. No respiratory distress. She has no wheezes. She has no rales. Abdominal: Soft. She exhibits no distension. There is no tenderness. Musculoskeletal:   The right knee is swollen but not erythematous or warm to touch or bruised. No abrasion. Ankle and hip are nontender. Lymphadenopathy:     She has no cervical adenopathy. Neurological: She is alert and oriented to person, place, and time. Skin: Skin is warm and dry. No rash noted. She is not diaphoretic. No erythema. Psychiatric: She has a normal mood and affect. Her behavior is normal.   Vitals reviewed. DIAGNOSTIC RESULTS     EKG: All EKG's are interpreted by the Emergency Department Physician who either signs or Co-signs this chart in the absence of a cardiologist.    Not indicated    RADIOLOGY:   Non-plain film images such as CT, Ultrasound and MRI are read by the radiologist. [facility] radiographic images are visualized and preliminarily interpreted by the emergency physician with the below findings:    Interpretation per the Radiologist below, if available at the time of this note:    Xr Knee Right (3 Views)    Result Date: 5/6/2019  EXAMINATION: 3 XRAY VIEWS OF THE RIGHT KNEE 5/6/2019 11:09 am COMPARISON: None. HISTORY: ORDERING SYSTEM PROVIDED HISTORY: Pain TECHNOLOGIST PROVIDED HISTORY: Pain Acuity: Acute Type of Exam: Initial FINDINGS: There is no acute fracture or dislocation. There is a small to moderate joint effusion, limited by the obliquity of the lateral projection. .  The bones are normally mineralized. There are no bony destructive lesions.   The joint spaces are maintained. 1. Small to moderate joint effusion. LABS:  Labs Reviewed - No data to display    All other labs were within normal range or not returned as of this dictation. EMERGENCY DEPARTMENT COURSE and DIFFERENTIAL DIAGNOSIS/MDM:   Vitals:    Vitals:    05/06/19 1051   BP: 126/60   Pulse: 87   Resp: 20   Temp: 98.2 °F (36.8 °C)   TempSrc: Oral   SpO2: 100%   Weight: 176 lb (79.8 kg)   Height: 5' 6\" (1.676 m)       Orders Placed This Encounter   Medications    ibuprofen (ADVIL;MOTRIN) 800 MG tablet     Sig: Take 1 tablet by mouth every 8 hours as needed for Pain     Dispense:  20 tablet     Refill:  0       Medical Decision Making: X-ray findings are discussed with the patient. Ace wrap applied and application was checked by me and found to be appropriate. She was placed on crutches and will follow-up with her doctor. Treatment diagnosis and follow-up were discussed with the patient. CONSULTS:  None    PROCEDURES:  None    FINAL IMPRESSION      1.  Acute pain of right knee          DISPOSITION/PLAN   DISPOSITION Decision To Discharge 05/06/2019 11:42:12 AM      PATIENT REFERRED TO:   Yang Rubio MD  [facility] 36  [facility] [phone]      As needed    [facility] ED  [address] [facility]  [phone]    If symptoms worsen      DISCHARGE MEDICATIONS:     New Prescriptions    IBUPROFEN (ADVIL;MOTRIN) 800 MG TABLET    Take 1 tablet by mouth every 8 hours as needed for Pain         (Please note that portions of this note were completed with a voice recognition program.  Efforts were made to edit the dictations but occasionally words are mis-transcribed.)    Yosef Coker MD  Attending Emergency Physician           Yosef Coker MD  05/06/19 2256

## 2019-05-06 NOTE — ED NOTES
Pt fitted for crutches / pt demonstrates and verbalized understanding regarding safe use for crutch ambulation     Osiris Rushing LPN  30/33/44 [id number]

## 2019-10-04 NOTE — TELEPHONE ENCOUNTER
See me on Monday  Advised patient to take ibuprofen over-the-counter 2 or 3 Times a day needed assist

## 2020-05-01 ENCOUNTER — TELEPHONE (OUTPATIENT)
Dept: INTERNAL MEDICINE CLINIC | Age: 40
End: 2020-05-01

## 2021-05-11 ENCOUNTER — TELEPHONE (OUTPATIENT)
Dept: INTERNAL MEDICINE CLINIC | Age: 41
End: 2021-05-11

## 2021-05-11 NOTE — TELEPHONE ENCOUNTER
The following patient is requesting a new patient appointment    With Provider: Dr. Gladys Delgadillo: BC/CRISTIANO    Medications / Conditions / Complaint : n/a    Preferred Days: Anytime    Preferred Time: Anytime    Best Contact Number: [phone]

## 2021-05-26 ENCOUNTER — OFFICE VISIT (OUTPATIENT)
Dept: INTERNAL MEDICINE CLINIC | Age: 41
End: 2021-05-26
Payer: COMMERCIAL

## 2021-05-26 ENCOUNTER — HOSPITAL ENCOUNTER (OUTPATIENT)
Age: 41
Setting detail: SPECIMEN
Discharge: HOME | End: 2021-05-26
Payer: COMMERCIAL

## 2021-05-26 VITALS
TEMPERATURE: 98.4 F | BODY MASS INDEX: 27 KG/M2 | HEART RATE: 72 BPM | RESPIRATION RATE: 16 BRPM | SYSTOLIC BLOOD PRESSURE: 122 MMHG | DIASTOLIC BLOOD PRESSURE: 64 MMHG | HEIGHT: 66 IN | WEIGHT: 168 LBS

## 2021-05-26 DIAGNOSIS — N92.6 IRREGULAR MENSES: ICD-10-CM

## 2021-05-26 DIAGNOSIS — N92.1 MENORRHAGIA WITH IRREGULAR CYCLE: ICD-10-CM

## 2021-05-26 DIAGNOSIS — N92.1 MENORRHAGIA WITH IRREGULAR CYCLE: Primary | ICD-10-CM

## 2021-05-26 DIAGNOSIS — Z12.39 ENCOUNTER FOR SCREENING BREAST EXAMINATION: ICD-10-CM

## 2021-05-26 DIAGNOSIS — Z01.411 ENCOUNTER FOR GYNECOLOGICAL EXAMINATION WITH ABNORMAL FINDING: ICD-10-CM

## 2021-05-26 LAB
ALBUMIN SERPL-MCNC: 4.4 G/DL (ref 3.5–5.2)
ALBUMIN/GLOBULIN RATIO: 1.8 (ref 1–2.5)
ALP BLD-CCNC: 53 U/L (ref 35–104)
ALT SERPL-CCNC: 15 U/L (ref 5–33)
ANION GAP SERPL CALCULATED.3IONS-SCNC: 16 MMOL/L (ref 9–17)
AST SERPL-CCNC: 24 U/L
BILIRUB SERPL-MCNC: 0.47 MG/DL (ref 0.3–1.2)
BUN BLDV-MCNC: 10 MG/DL (ref 6–20)
BUN/CREAT BLD: ABNORMAL (ref 9–20)
CALCIUM SERPL-MCNC: 9 MG/DL (ref 8.6–10.4)
CHLORIDE BLD-SCNC: 108 MMOL/L (ref 98–107)
CO2: 20 MMOL/L (ref 20–31)
CREAT SERPL-MCNC: 0.48 MG/DL (ref 0.5–0.9)
GFR AFRICAN AMERICAN: >60 ML/MIN
GFR NON-AFRICAN AMERICAN: >60 ML/MIN
GFR SERPL CREATININE-BSD FRML MDRD: ABNORMAL ML/MIN/{1.73_M2}
GFR SERPL CREATININE-BSD FRML MDRD: ABNORMAL ML/MIN/{1.73_M2}
GLUCOSE BLD-MCNC: 82 MG/DL (ref 70–99)
HCT VFR BLD CALC: 41.2 % (ref 36.3–47.1)
HEMOGLOBIN: 13.4 G/DL (ref 11.9–15.1)
MCH RBC QN AUTO: 33.6 PG (ref 25.2–33.5)
MCHC RBC AUTO-ENTMCNC: 32.5 G/DL (ref 28.4–34.8)
MCV RBC AUTO: 103.3 FL (ref 82.6–102.9)
NRBC AUTOMATED: 0 PER 100 WBC
PDW BLD-RTO: 12.8 % (ref 11.8–14.4)
PLATELET # BLD: 263 K/UL (ref 138–453)
PMV BLD AUTO: 10.2 FL (ref 8.1–13.5)
POTASSIUM SERPL-SCNC: 4.3 MMOL/L (ref 3.7–5.3)
RBC # BLD: 3.99 M/UL (ref 3.95–5.11)
RHEUMATOID FACTOR: <10 IU/ML
SEDIMENTATION RATE, ERYTHROCYTE: 2 MM (ref 0–20)
SODIUM BLD-SCNC: 144 MMOL/L (ref 135–144)
TOTAL PROTEIN: 6.8 G/DL (ref 6.4–8.3)
TSH SERPL DL<=0.05 MIU/L-ACNC: 2.24 MIU/L (ref 0.3–5)
WBC # BLD: 8.1 K/UL (ref 3.5–11.3)

## 2021-05-26 PROCEDURE — 99214 OFFICE O/P EST MOD 30 MIN: CPT | Performed by: INTERNAL MEDICINE

## 2021-05-26 RX ORDER — OMEGA-3S/DHA/EPA/FISH OIL/D3 300MG-1000
400 CAPSULE ORAL DAILY
COMMUNITY

## 2021-05-26 RX ORDER — GLUCOSAMINE/D3/BOSWELLIA SERRA 1500MG-400
TABLET ORAL
COMMUNITY

## 2021-05-26 RX ORDER — MULTIVIT WITH MINERALS/LUTEIN
250 TABLET ORAL DAILY
COMMUNITY

## 2021-05-26 ASSESSMENT — PATIENT HEALTH QUESTIONNAIRE - PHQ9
2. FEELING DOWN, DEPRESSED OR HOPELESS: 0
SUM OF ALL RESPONSES TO PHQ QUESTIONS 1-9: 0
SUM OF ALL RESPONSES TO PHQ QUESTIONS 1-9: 0
1. LITTLE INTEREST OR PLEASURE IN DOING THINGS: 0
SUM OF ALL RESPONSES TO PHQ QUESTIONS 1-9: 0
SUM OF ALL RESPONSES TO PHQ9 QUESTIONS 1 & 2: 0

## 2021-05-26 NOTE — PROGRESS NOTES
Placed urgent gyn referral in [facility], also called office an LM asking to call pt asap    Pt will call back with appt date

## 2021-05-26 NOTE — PROGRESS NOTES
Allison Thompson is a 36 y.o. female who presents for   Chief Complaint   Patient presents with    Back Pain     back pain has not been bothersome at all    Menstrual Problem     severe cramps, heavy bleeding, irregular- set appt with GYN but cant see until Sept     Skin Tag     near right eye    Immunizations     did have Covid vaccines    and follow up of chronic medical problems. Patient Active Problem List   Diagnosis    Depression    HSIL on Pap smear of cervix    ASCUS on Pap smear    Vaginal discharge    Dyspareunia    Dysmenorrhea    Pelvic pain in female    BV (bacterial vaginosis)    Constipation    Nausea    Weight gain    Left leg numbness    Chronic bilateral low back pain without sciatica    History of lumbar surgery     HPI  Here for evaluation of for irregular menstrual cycles and abdominal cramping going on for more than a year and getting worse and patient has family history     Current Outpatient Medications   Medication Sig Dispense Refill    Ascorbic Acid (VITAMIN C) 250 MG tablet Take 250 mg by mouth daily      vitamin D3 (CHOLECALCIFEROL) 10 MCG (400 UNIT) TABS tablet Take 400 Units by mouth daily      Biotin 27682 MCG TABS Take by mouth       No current facility-administered medications for this visit.        Allergies   Allergen Reactions    Darvocet-N 100 [Propoxyphene N-Acetaminophen]      Unsure of reaction       Past Medical History:   Diagnosis Date    ADHD (attention deficit hyperactivity disorder)     ASCUS on Pap smear 11/3/2008    HPV +    Chronic back pain     cyst on L5-S1several times post back surgery.  / on 10/12/18 pt denies pain mgmnt    Depression     HSIL (high grade squamous intraepithelial lesion) on Pap smear 3/25/2008    moderate & severe dysplasia, CIS/NARCISO 2 & 3    Neuropathy     leg and toe from back surgery    PVD (peripheral vascular disease) ([facility] [address]        Past Surgical History:   Procedure Laterality Date    ABDOMEN SURGERY  02/20/2014 exp. lap with KAL    BACK SURGERY  1/28/10    L5-S1    COLPOSCOPY  4/2008    CYST REMOVAL  4/23/10    lumbar area    CYST REMOVAL  7/2011    from back L5-S1    OTHER SURGICAL HISTORY  5/2008    cervical cone by laser ablation    TUBAL LIGATION  2002       Family History   Problem Relation Age of Onset    Cancer Maternal Grandmother         throat and lung    Thyroid Disease Maternal Grandmother     Heart Disease Maternal Grandfather     Breast Cancer Mother     Thyroid Disease Mother     Breast Cancer Paternal Aunt     No Known Problems Father     Diabetes Paternal Grandfather     Cancer Paternal Grandfather         lung    Cancer Maternal Aunt         kidney    Thyroid Disease Maternal Aunt     Cancer Paternal Aunt         stomach     ROS   Constitutional:  Negative for fatigue, loss of appetite and unexpected weight change   HEENT            : Negative for neck stiffness and pain, no congestion or sinus pressure   Eyes                : No visual disturbance or pain   Cardiovascular: No chest pain or palpitations or leg swelling   Respiratory      : Negative for cough, shortness of breath or wheezing   Gastrointestinal: Negative for abdominal pain, constipation or diarrhea and bloating No nausea or vomiting   Genitourinary:     No urgency or frequency, no burning or hematuria   Musculoskeletal: No arthralgias, back pain or myalgias   Skin                  : Negative for rash or erythema   Neurological    : Negative for dizziness, weakness, tremors ,light headedness or syncope   Psychiatric       : Negative for dysphoric mood, sleep disturbances, nervous or anxious, or decreased concentration   All other review of systems was negative    Objective  Physical Examination:    Nursing note reviewed    /64 (Site: Left Upper Arm, Position: Sitting, Cuff Size: Medium Adult)   Pulse 72   Temp 98.4 °F (36.9 °C) (Infrared)   Resp 16   Ht 5' 6\" (1.676 m)   Wt 168 lb (76.2 kg)   BMI 27.12 kg/m²   BP Readings from Last 3 Encounters:   05/26/21 122/64   05/06/19 126/60   10/23/18 127/67         Constitutional:  Stefanie Burnette is oriented to place, person and time ,appears well-developed and well-nourished  HEENT:  Atraumatic and normocephalic, external ears normal bilaterally, nose normal no oropharyngeal exudate and is clear and moist  Eyes:  EOCM normal; conjunctivae normal; PERRLA bilaterally  Neck:  Normal range of motion, neck supple, no JVD and no thyromegaly  Cardiovascular:  RRR, normal heart sounds and intact distal pulses  Pulmonary:  effort normal and breath sounds normal bilaterally,no wheezes or rales, no respiratory distress  Abdominal:  Soft, non-tender; normal bowel sounds, no masses  Musculoskeletal:  Normal range of motion and no edema or tenderness bilaterally  No lymphadenopathy  Neurological:  alert, oriented, and normal reflexes bilaterally  Skin: warm and dry  Psychiatric:  normal mood and effect; behavior normal.    Labs:   No results found for: LABA1C  Lab Results   Component Value Date    CHOL 122 09/23/2016     Lab Results   Component Value Date    HDL 54 09/23/2016     No results found for: [address]  Lab Results   Component Value Date    TRIG 43 09/23/2016     No components found for: [place], [place]  Lab Results   Component Value Date    WBC 9.6 06/08/2017    HGB 13.9 06/08/2017    HCT 41.5 06/08/2017    MCV 96.8 06/08/2017     06/08/2017     No results found for: INR, PROTIME  Lab Results   Component Value Date    GLUCOSE 82 06/08/2017    CREATININE 0.75 06/08/2017    BUN 14 06/08/2017     06/08/2017    K 4.5 06/08/2017     06/08/2017    CO2 22 06/08/2017     Lab Results   Component Value Date    ALT 14 06/08/2017    AST 19 06/08/2017    ALKPHOS 52 06/08/2017    BILITOT 0.84 06/08/2017     Lab Results   Component Value Date    LABALBU 4.7 06/08/2017     Lab Results   Component Value Date    TSH 3.10 06/08/2017     Assessment:  1.  Menorrhagia with irregular cycle 2. Encounter for screening breast examination    3. Encounter for gynecological examination with abnormal finding    4. Irregular menses        Plan:  Patient is referred to OB and GYN and if they cannot see her soon will do an ultrasound of the uterus  Labs ordered to check for CBC CMP TSH RASHAD rheumatoid factor and sed rate  Patient taking Aleve or Advil as needed for abdominal cramping when she had the menstrual.'s  Review in 3 months           1. Laura received counseling on the following healthy behaviors: nutrition and exercise    2. Prior labs and health maintenance reviewed. 3.  Discussed use, benefit, and side effects of prescribed medications. Barriers to medication compliance addressed. All her questions were answered. Pt voiced understanding. Breanna Alvarez will continue current medications, diet and exercise. No orders of the defined types were placed in this encounter. Completed Refills               Requested Prescriptions      No prescriptions requested or ordered in this encounter     4. Patient given educational materials - see patient instructions    5. Was a self-tracking handout given in paper form or via [employer]?   NO    Orders Placed This Encounter   Procedures    ELIAS DIGITAL SCREEN W OR WO CAD BILATERAL     Standing Status:   Future     Standing Expiration Date:   7/26/2022    Comprehensive Metabolic Panel     Standing Status:   Future     Standing Expiration Date:   5/26/2022    TSH without Reflex     Standing Status:   Future     Standing Expiration Date:   5/26/2022    CBC     Standing Status:   Future     Standing Expiration Date:   5/26/2022    RASHAD     Standing Status:   Future     Standing Expiration Date:   5/26/2022    Rheumatoid Factor     Standing Status:   Future     Standing Expiration Date:   5/26/2022    Sedimentation rate, manual     Standing Status:   Future     Standing Expiration Date:   5/26/2022   [facility] - [place] [facility], [address] [facility], Gynecology, Rosina Howe Referral Priority:   Routine     Referral Type:   Eval and Treat     Referral Reason:   Specialty Services Required     Referred to Provider:   LYNDSAY Stevens CNM     Requested Specialty:   Obstetrics & Gynecology     Number of Visits Requested:   1     Return in about 3 months (around 8/26/2021). Patient voiced understanding and agreed to treatment plan. Electronically signed by Consuelo Sandhu MD on 5/26/2021 at 1:38 PM    This note is created with a voice recognition program and while intend to generate a document that accurately reflects the content of the visit, no guarantee can be provided that every mistake has been identified and corrected by editing.

## 2021-06-01 LAB — ANTI-NUCLEAR ANTIBODY (ANA): NEGATIVE

## 2021-06-28 ENCOUNTER — OFFICE VISIT (OUTPATIENT)
Dept: OBGYN CLINIC | Age: 41
End: 2021-06-28
Payer: COMMERCIAL

## 2021-06-28 VITALS
BODY MASS INDEX: 27.84 KG/M2 | HEIGHT: 66 IN | WEIGHT: 173.25 LBS | SYSTOLIC BLOOD PRESSURE: 112 MMHG | DIASTOLIC BLOOD PRESSURE: 68 MMHG | HEART RATE: 73 BPM

## 2021-06-28 DIAGNOSIS — Z01.419 WELL WOMAN EXAM WITH ROUTINE GYNECOLOGICAL EXAM: Primary | ICD-10-CM

## 2021-06-28 DIAGNOSIS — N92.0 MENORRHAGIA WITH REGULAR CYCLE: ICD-10-CM

## 2021-06-28 PROCEDURE — 99386 PREV VISIT NEW AGE 40-64: CPT | Performed by: OBSTETRICS & GYNECOLOGY

## 2021-06-28 ASSESSMENT — ENCOUNTER SYMPTOMS
ABDOMINAL PAIN: 0
BACK PAIN: 0
SHORTNESS OF BREATH: 0
COUGH: 0

## 2021-06-28 NOTE — PROGRESS NOTES
[facility] PHYSICIANS  [facility] - [address] [facility] [address] [facility]  Dept: [phone]    Chief complaint:   Chief Complaint   Patient presents with    Gynecologic Exam     Last pap 2014 LSIL        History Present Illness: Andrew Ruiz is a 35 yo female who presents for her annual exam, and to establish care. She says that her periods are starting to last longer. She says the pain starts during ovulation and then is worse during her periods. She says it has gotten worse as she has gotten older. She says that she has a lot of gas during her periods and sometimes it feels like it comes out the vagina instead of the rectum. She is sexually active with a single male partner. They have been together for 6 years. She has always had male partners. She has participated in oral and vaginal sex, but denies any anal sex. She denies any bowel or bladder issues. Current Medications (OTC/Herbal):   Current Outpatient Medications   Medication Sig Dispense Refill    Ascorbic Acid (VITAMIN C) 250 MG tablet Take 250 mg by mouth daily      vitamin D3 (CHOLECALCIFEROL) 10 MCG (400 UNIT) TABS tablet Take 400 Units by mouth daily      Biotin 26420 MCG TABS Take by mouth       No current facility-administered medications for this visit. Allergies:    Allergies   Allergen Reactions    Darvocet-N 100 [Propoxyphene N-Acetaminophen]      Unsure of reaction     Past Medical History:   Past Medical History:   Diagnosis Date    Abnormal Pap smear of cervix     ADHD (attention deficit hyperactivity disorder)     ASCUS on Pap smear 11/3/2008    HPV +    Chronic back pain     cyst on L5-S1several times post back surgery.  / on 10/12/18 pt denies pain mgmnt    Depression     HSIL (high grade squamous intraepithelial lesion) on Pap smear 3/25/2008    moderate & severe dysplasia, CIS/NARCISO 2 & 3    Neuropathy     leg and toe from back surgery    PVD (peripheral vascular disease) ([facility])      Past Surgical History:   Past Surgical History:   Procedure Laterality Date    ABDOMEN SURGERY  2014    exp.  lap with KAL    BACK SURGERY  1/28/10    L5-S1    COLPOSCOPY  2008    CYST REMOVAL  4/23/10    lumbar area    CYST REMOVAL  2011    from back L5-S1    OTHER SURGICAL HISTORY  2008    cervical cone by laser ablation    TUBAL LIGATION       Obstetric History:   3  Para 3  Gynecologic History: LMP 21   Menarche 14  Duration 6-7 d    Interval q  30 d  Tampons/Pads in a day: ultra tampons q 2-3 hrs on heaviest day  Last Pap: 16       Any history of abnormal paps yes    PriorColpo/Biopsy colp     Last Mammogram 2016 Result normal  Contraception: BTL  Complications: none  STDs: none  Psychosocial History: Occupation:   Realtor   Caffeine Yes    At risk for depression No    Abuse:   No  Seatbelt:   Yes  Exercise:  Gym 4x/wk 40 min cardio and then weights    Social History     Socioeconomic History    Marital status: Single     Spouse name: Not on file    Number of children: 3    Years of education: Not on file    Highest education level: Not on file   Occupational History    Occupation: realtor     Employer: [employer]   Tobacco Use    Smoking status: Current Every Day Smoker     Packs/day: 0.50     Types: Cigarettes    Smokeless tobacco: Never Used   Substance and Sexual Activity    Alcohol use: Not Currently     Alcohol/week: 0.0 standard drinks     Types: 1 Standard drinks or equivalent per week     Comment: rarely    Drug use: No    Sexual activity: Yes     Partners: Male   Other Topics Concern    Not on file   Social History Narrative    Not on file     Social Determinants of Health     Financial Resource Strain: Low Risk     Difficulty of Paying Living Expenses: Not hard at all   Food Insecurity: No Food Insecurity    Worried About Running Out of Food in the Last Year: Never true    Vasile of Food in the Last Year: Never true   Transportation auscultation in all fields bilaterally, without wheezes,rales or rhonchi. Cardiac Exam: Normal sinus rhythm andrate, without murmurs, rubs or gallops appreciated. Breast Exam: Symmetric without pathological skin changes, nontender without discrete suspicious masses palpated, supraclavicular or axillary adenopathy or nipple discharge noted. Abdominal Exam: Nontender to deep palpation without organomegaly, masses or CVAT appreciated, BS positive. No spinal deformation or tenderness. External Genitalia: Normal development without vulvar,vaginal or cervical lesions noted. Normal vaginal discharge, uterus anterior, 4-6 weeks without CMT. Adnexa nontender without abnormal masses bilaterally. Rectal Exam: Omitted. Extremities: Nontender without clubbing, cyanosis or edema. F.R.O.M. Neurologic Exam: Grossly intact without noted sensorimotor deficits and oriented x 3. Assessment/Plan:   Unremarkable annual Gyn exam.    Cervical Cytology Evaluation begins at 24years old. If Negative Cytology, Follow-up screening per current guidelines. Mammograms every 1year. If 37 yo and last mammogram was negative. Calcium and Vitamin D dosing reviewed. Colonoscopy screening reviewed as well as onset for bone density testing. Heavy menstrual bleeding - pt is a smoker so unable to use combo BC. Discussed IUD and brochure given. Birth control and barrier recommendations discussed. STD counseling and prevention reviewed. Routine health maintenance per patients PCP.   Pt to follow up for annual exam in 1 year    Braulio Albright MD  [address]

## 2021-07-07 DIAGNOSIS — Z01.419 WELL WOMAN EXAM WITH ROUTINE GYNECOLOGICAL EXAM: ICD-10-CM

## 2021-08-24 ENCOUNTER — TELEPHONE (OUTPATIENT)
Dept: FAMILY MEDICINE CLINIC | Age: 41
End: 2021-08-24

## 2021-08-24 NOTE — TELEPHONE ENCOUNTER
Katy Segovia was contacted as part of mammography outreach. Unable to contact patient.      Sangeeta Toledo

## 2022-06-28 ENCOUNTER — TELEPHONE (OUTPATIENT)
Dept: INTERNAL MEDICINE CLINIC | Age: 42
End: 2022-06-28

## 2022-07-14 NOTE — TELEPHONE ENCOUNTER
----- Message from [address] sent at 6/27/2022 10:31 AM EDT -----  Subject: Message to Provider    QUESTIONS  Information for Provider? pts [place]. would like to est.   with Dr. Liliana Jiang. He would be a self pay. He has a rash on face and   would like to be seen. Please call Shanice Tejada @ [phone] if you agree to   accept him as a new patient.   ---------------------------------------------------------------------------  --------------  [address]  What is the best way for the office to contact you? OK to leave message on   voicemail  Preferred Call Back Phone Number? [phone]  ---------------------------------------------------------------------------  --------------  SCRIPT ANSWERS  Relationship to Patient?  Self Introduction Text (Please End With A Colon): The following procedure was deferred: Detail Level: Detailed

## 2022-07-28 ENCOUNTER — OFFICE VISIT (OUTPATIENT)
Dept: INTERNAL MEDICINE CLINIC | Age: 42
End: 2022-07-28
Payer: COMMERCIAL

## 2022-07-28 VITALS
TEMPERATURE: 97 F | HEART RATE: 81 BPM | WEIGHT: 175.8 LBS | BODY MASS INDEX: 28.25 KG/M2 | SYSTOLIC BLOOD PRESSURE: 82 MMHG | RESPIRATION RATE: 20 BRPM | DIASTOLIC BLOOD PRESSURE: 60 MMHG | OXYGEN SATURATION: 99 % | HEIGHT: 66 IN

## 2022-07-28 DIAGNOSIS — M54.50 ACUTE BILATERAL LOW BACK PAIN WITHOUT SCIATICA: ICD-10-CM

## 2022-07-28 DIAGNOSIS — E66.3 OVERWEIGHT (BMI 25.0-29.9): Primary | ICD-10-CM

## 2022-07-28 PROCEDURE — 99214 OFFICE O/P EST MOD 30 MIN: CPT | Performed by: INTERNAL MEDICINE

## 2022-07-28 RX ORDER — PHENTERMINE HYDROCHLORIDE 37.5 MG/1
37.5 CAPSULE ORAL EVERY MORNING
Qty: 30 CAPSULE | Refills: 0 | Status: SHIPPED | OUTPATIENT
Start: 2022-07-28 | End: 2022-08-25 | Stop reason: SDUPTHER

## 2022-07-28 SDOH — ECONOMIC STABILITY: FOOD INSECURITY: WITHIN THE PAST 12 MONTHS, YOU WORRIED THAT YOUR FOOD WOULD RUN OUT BEFORE YOU GOT MONEY TO BUY MORE.: NEVER TRUE

## 2022-07-28 SDOH — ECONOMIC STABILITY: FOOD INSECURITY: WITHIN THE PAST 12 MONTHS, THE FOOD YOU BOUGHT JUST DIDN'T LAST AND YOU DIDN'T HAVE MONEY TO GET MORE.: NEVER TRUE

## 2022-07-28 ASSESSMENT — PATIENT HEALTH QUESTIONNAIRE - PHQ9
SUM OF ALL RESPONSES TO PHQ9 QUESTIONS 1 & 2: 0
1. LITTLE INTEREST OR PLEASURE IN DOING THINGS: 0
8. MOVING OR SPEAKING SO SLOWLY THAT OTHER PEOPLE COULD HAVE NOTICED. OR THE OPPOSITE, BEING SO FIGETY OR RESTLESS THAT YOU HAVE BEEN MOVING AROUND A LOT MORE THAN USUAL: 0
4. FEELING TIRED OR HAVING LITTLE ENERGY: 0
5. POOR APPETITE OR OVEREATING: 0
SUM OF ALL RESPONSES TO PHQ QUESTIONS 1-9: 0
SUM OF ALL RESPONSES TO PHQ QUESTIONS 1-9: 0
10. IF YOU CHECKED OFF ANY PROBLEMS, HOW DIFFICULT HAVE THESE PROBLEMS MADE IT FOR YOU TO DO YOUR WORK, TAKE CARE OF THINGS AT HOME, OR GET ALONG WITH OTHER PEOPLE: 0
3. TROUBLE FALLING OR STAYING ASLEEP: 0
SUM OF ALL RESPONSES TO PHQ QUESTIONS 1-9: 0
2. FEELING DOWN, DEPRESSED OR HOPELESS: 0
6. FEELING BAD ABOUT YOURSELF - OR THAT YOU ARE A FAILURE OR HAVE LET YOURSELF OR YOUR FAMILY DOWN: 0
9. THOUGHTS THAT YOU WOULD BE BETTER OFF DEAD, OR OF HURTING YOURSELF: 0
SUM OF ALL RESPONSES TO PHQ QUESTIONS 1-9: 0
7. TROUBLE CONCENTRATING ON THINGS, SUCH AS READING THE NEWSPAPER OR WATCHING TELEVISION: 0

## 2022-07-28 ASSESSMENT — SOCIAL DETERMINANTS OF HEALTH (SDOH): HOW HARD IS IT FOR YOU TO PAY FOR THE VERY BASICS LIKE FOOD, HOUSING, MEDICAL CARE, AND HEATING?: NOT HARD AT ALL

## 2022-07-28 NOTE — PROGRESS NOTES
Edilson Landeros is a 43 y.o. female who presents for   Chief Complaint   Patient presents with    Weight Gain     Patient states she is concerned with her weight gain; would like to re-start adipex    Back Pain     Going on for 3 weeks    Health Maintenance     Tdap, breast,, hiv, hep c, lipids, covid, dep    and follow up of chronic medical problems. Patient Active Problem List   Diagnosis    Depression    HSIL on Pap smear of cervix    ASCUS on Pap smear    Vaginal discharge    Dyspareunia    Dysmenorrhea    Pelvic pain in female    BV (bacterial vaginosis)    Constipation    Nausea    Weight gain    Left leg numbness    Chronic bilateral low back pain without sciatica    History of lumbar surgery    Menorrhagia with regular cycle     HPI  Here for follow-up and also complains of recent strain in the back and seeing the chiropractor and improving and also wants to discuss about weight loss medication that she tried in the past    Current Outpatient Medications   Medication Sig Dispense Refill    phentermine (ADIPEX-P) 37.5 MG capsule Take 1 capsule by mouth every morning for 30 days. 30 capsule 0    vitamin D3 (CHOLECALCIFEROL) 10 MCG (400 UNIT) TABS tablet Take 400 Units by mouth daily      Ascorbic Acid (VITAMIN C) 250 MG tablet Take 250 mg by mouth daily (Patient not taking: Reported on 7/28/2022)      Biotin 11456 MCG TABS Take by mouth (Patient not taking: Reported on 7/28/2022)       No current facility-administered medications for this visit.        Allergies   Allergen Reactions    Darvocet-N 100 [Propoxyphene N-Acetaminophen]      Unsure of reaction       Past Medical History:   Diagnosis Date    Abnormal Pap smear of cervix     ADHD (attention deficit hyperactivity disorder)     ASCUS on Pap smear 11/3/2008    HPV +    Chronic back pain     cyst on L5-S1several times post back surgery.  / on 10/12/18 pt denies pain mgmnt    Depression     HSIL (high grade squamous intraepithelial lesion) on Pap smear 3/25/2008    moderate & severe dysplasia, CIS/NARCISO 2 & 3    Neuropathy     leg and toe from back surgery    PVD (peripheral vascular disease) [facility])        Past Surgical History:   Procedure Laterality Date    ABDOMEN SURGERY  02/20/2014    exp.  lap with KAL    BACK SURGERY  1/28/10    L5-S1    COLPOSCOPY  4/2008    CYST REMOVAL  4/23/10    lumbar area    CYST REMOVAL  7/2011    from back L5-S1    OTHER SURGICAL HISTORY  5/2008    cervical cone by laser ablation    TUBAL LIGATION  2002       Family History   Problem Relation Age of Onset    Cancer Maternal Grandmother         throat and lung    Thyroid Disease Maternal Grandmother     Heart Disease Maternal Grandfather     Breast Cancer Mother     Thyroid Disease Mother     Breast Cancer Paternal Aunt     No Known Problems Father     Diabetes Paternal Grandfather     Cancer Paternal Grandfather         lung    Cancer Maternal Aunt         kidney    Thyroid Disease Maternal Aunt     Cancer Paternal Aunt         stomach     ROS  Constitutional:  Negative for fatigue, loss of appetite and unexpected weight change  HEENT            : Negative for neck stiffness and pain, no congestion or sinus pressure  Eyes                : No visual disturbance or pain  Cardiovascular: No chest pain or palpitations or leg swelling  Respiratory      : Negative for cough, shortness of breath or wheezing  Gastrointestinal: Negative for abdominal pain, constipation or diarrhea and bloating No nausea or vomiting  Genitourinary:     No urgency or frequency, no burning or hematuria  Musculoskeletal: No arthralgias, back pain or myalgias  Skin                  : Negative for rash or erythema  Neurological    : Negative for dizziness, weakness, tremors ,light headedness or syncope  Psychiatric       : Negative for dysphoric mood, sleep disturbances, nervous or anxious, or decreased concentration  All other review of systems was negative    Objective  Physical Examination:    Nursing note reviewed    BP 82/60 (Site: Right Upper Arm, Position: Sitting, Cuff Size: Medium Adult)   Pulse 81   Temp 97 °F (36.1 °C) (Temporal)   Resp 20   Ht 5' 5.98\" (1.676 m)   Wt 175 lb 12.8 oz (79.7 kg)   SpO2 99%   BMI 28.39 kg/m²   BP Readings from Last 3 Encounters:   07/28/22 82/60   06/28/21 112/68   05/26/21 122/64         Constitutional:  Nicole Andersen is oriented to place, person and time ,appears well-developed and well-nourished  HEENT:  Atraumatic and normocephalic, external ears normal bilaterally, nose normal no oropharyngeal exudate and is clear and moist  Eyes:  EOCM normal; conjunctivae normal; PERRLA bilaterally  Neck:  Normal range of motion, neck supple, no JVD and no thyromegaly  Cardiovascular:  RRR, normal heart sounds and intact distal pulses  Pulmonary:  effort normal and breath sounds normal bilaterally,no wheezes or rales, no respiratory distress  Abdominal:  Soft, non-tender; normal bowel sounds, no masses  Musculoskeletal:  Normal range of motion and no edema or tenderness bilaterally  No lymphadenopathy  Neurological:  alert, oriented, and normal reflexes bilaterally  Skin: warm and dry  Psychiatric:  normal mood and effect; behavior normal.    Labs:   No results found for: LABA1C  Lab Results   Component Value Date    CHOL 122 09/23/2016     Lab Results   Component Value Date    HDL 54 09/23/2016     No results found for: [address]  Lab Results   Component Value Date    TRIG 43 09/23/2016     No results found for: [place], [place]  Lab Results   Component Value Date    WBC 8.1 05/26/2021    HGB 13.4 05/26/2021    HCT 41.2 05/26/2021    .3 (H) 05/26/2021     05/26/2021     No results found for: INR, PROTIME  Lab Results   Component Value Date    GLUCOSE 82 05/26/2021    CREATININE 0.48 (L) 05/26/2021    BUN 10 05/26/2021     05/26/2021    K 4.3 05/26/2021     (H) 05/26/2021    CO2 20 05/26/2021     Lab Results   Component Value Date    ALT 15 05/26/2021    AST 24 05/26/2021 ALKPHOS 53 05/26/2021    BILITOT 0.47 05/26/2021     Lab Results   Component Value Date    LABALBU 4.4 05/26/2021     Lab Results   Component Value Date    TSH 2.24 05/26/2021     Assessment:  1. Overweight (BMI 25.0-29.9)    2. Acute bilateral low back pain without sciatica        Plan:  Patient's back pain improved and seeing the chiropractor  Patient's weight is moderate and patient states she gained about 8 pounds which showing up on our chart and advised patient to try Adipex for 1 month as she is requested and advised to continue diet and exercise  Review in 1 month           1. Laura received counseling on the following healthy behaviors: nutrition and exercise    2. Prior labs and health maintenance reviewed. 3.  Discussed use, benefit, and side effects of prescribed medications. Barriers to medication compliance addressed. All her questions were answered. Pt voiced understanding. Jodi Orellana will continue current medications, diet and exercise. Orders Placed This Encounter   Medications    phentermine (ADIPEX-P) 37.5 MG capsule     Sig: Take 1 capsule by mouth every morning for 30 days. Dispense:  30 capsule     Refill:  0          Completed Refills               Requested Prescriptions     Signed Prescriptions Disp Refills    phentermine (ADIPEX-P) 37.5 MG capsule 30 capsule 0     Sig: Take 1 capsule by mouth every morning for 30 days. 4. Patient given educational materials - see patient instructions    5. Was a self-tracking handout given in paper form or via [employer]? NO    No orders of the defined types were placed in this encounter. Return in about 1 month (around 8/28/2022). Patient voiced understanding and agreed to treatment plan.      Electronically signed by Minor Snellen, MD on 7/28/2022 at 4:58 PM    This note is created with a voice recognition program and while intend to generate a document that accurately reflects the content of the visit, no guarantee can be provided that every mistake has been identified and corrected by editing.

## 2022-08-25 ENCOUNTER — OFFICE VISIT (OUTPATIENT)
Dept: INTERNAL MEDICINE CLINIC | Age: 42
End: 2022-08-25
Payer: COMMERCIAL

## 2022-08-25 VITALS
WEIGHT: 168.6 LBS | SYSTOLIC BLOOD PRESSURE: 102 MMHG | BODY MASS INDEX: 27.1 KG/M2 | DIASTOLIC BLOOD PRESSURE: 80 MMHG | RESPIRATION RATE: 18 BRPM | HEIGHT: 66 IN | HEART RATE: 72 BPM | TEMPERATURE: 97.4 F | OXYGEN SATURATION: 98 %

## 2022-08-25 DIAGNOSIS — Z12.31 SCREENING MAMMOGRAM FOR HIGH-RISK PATIENT: ICD-10-CM

## 2022-08-25 DIAGNOSIS — R14.0 BLOATING: ICD-10-CM

## 2022-08-25 DIAGNOSIS — E66.3 OVERWEIGHT (BMI 25.0-29.9): Primary | ICD-10-CM

## 2022-08-25 PROCEDURE — 99214 OFFICE O/P EST MOD 30 MIN: CPT | Performed by: INTERNAL MEDICINE

## 2022-08-25 RX ORDER — PHENTERMINE HYDROCHLORIDE 37.5 MG/1
37.5 CAPSULE ORAL EVERY MORNING
Qty: 30 CAPSULE | Refills: 0 | Status: SHIPPED | OUTPATIENT
Start: 2022-08-25 | End: 2022-09-24

## 2022-08-25 NOTE — PROGRESS NOTES
Travis Stallings is a 43 y.o. female who presents for   Chief Complaint   Patient presents with    Abdominal Cramping     Patient having some stomach issues    Stool Color Change     Patient states stool changes colors    Health Maintenance     Varicella, pneumo, hiv, hep c, tdap,    and follow up of chronic medical problems. Patient Active Problem List   Diagnosis    Depression    HSIL on Pap smear of cervix    ASCUS on Pap smear    Vaginal discharge    Dyspareunia    Dysmenorrhea    Pelvic pain in female    BV (bacterial vaginosis)    Constipation    Nausea    Weight gain    Left leg numbness    Chronic bilateral low back pain without sciatica    History of lumbar surgery    Menorrhagia with regular cycle     HPI  Here for follow-up on weight loss and complaints of bloating    Current Outpatient Medications   Medication Sig Dispense Refill    phentermine (ADIPEX-P) 37.5 MG capsule Take 1 capsule by mouth every morning for 30 days. 30 capsule 0    vitamin D3 (CHOLECALCIFEROL) 10 MCG (400 UNIT) TABS tablet Take 400 Units by mouth daily      Ascorbic Acid (VITAMIN C) 250 MG tablet Take 250 mg by mouth daily (Patient not taking: No sig reported)      Biotin 03315 MCG TABS Take by mouth (Patient not taking: No sig reported)       No current facility-administered medications for this visit.        Allergies   Allergen Reactions    Darvocet-N 100 [Propoxyphene N-Acetaminophen]      Unsure of reaction       Past Medical History:   Diagnosis Date    Abnormal Pap smear of cervix     ADHD (attention deficit hyperactivity disorder)     ASCUS on Pap smear 11/3/2008    HPV +    Chronic back pain     cyst on L5-S1several times post back surgery.  / on 10/12/18 pt denies pain mgmnt    Depression     HSIL (high grade squamous intraepithelial lesion) on Pap smear 3/25/2008    moderate & severe dysplasia, CIS/NARCISO 2 & 3    Neuropathy     leg and toe from back surgery    PVD (peripheral vascular disease) ([facility] [address]        Past Surgical History:   Procedure Laterality Date    ABDOMEN SURGERY  02/20/2014    exp.  lap with KAL    BACK SURGERY  1/28/10    L5-S1    COLPOSCOPY  4/2008    CYST REMOVAL  4/23/10    lumbar area    CYST REMOVAL  7/2011    from back L5-S1    OTHER SURGICAL HISTORY  5/2008    cervical cone by laser ablation    TUBAL LIGATION  2002       Family History   Problem Relation Age of Onset    Cancer Maternal Grandmother         throat and lung    Thyroid Disease Maternal Grandmother     Heart Disease Maternal Grandfather     Breast Cancer Mother     Thyroid Disease Mother     Breast Cancer Paternal Aunt     No Known Problems Father     Diabetes Paternal Grandfather     Cancer Paternal Grandfather         lung    Cancer Maternal Aunt         kidney    Thyroid Disease Maternal Aunt     Cancer Paternal Aunt         stomach     ROS  Constitutional:  Negative for fatigue, loss of appetite and unexpected weight change  HEENT            : Negative for neck stiffness and pain, no congestion or sinus pressure  Eyes                : No visual disturbance or pain  Cardiovascular: No chest pain or palpitations or leg swelling  Respiratory      : Negative for cough, shortness of breath or wheezing  Gastrointestinal: Negative for abdominal pain, constipation or diarrhea and bloating No nausea or vomiting  Genitourinary:     No urgency or frequency, no burning or hematuria  Musculoskeletal: No arthralgias, back pain or myalgias  Skin                  : Negative for rash or erythema  Neurological    : Negative for dizziness, weakness, tremors ,light headedness or syncope  Psychiatric       : Negative for dysphoric mood, sleep disturbances, nervous or anxious, or decreased concentration  All other review of systems was negative    Objective  Physical Examination:    Nursing note reviewed    /80 (Site: Right Upper Arm, Position: Sitting, Cuff Size: Medium Adult)   Pulse 72   Temp 97.4 °F (36.3 °C) (Temporal)   Resp 18   Ht 5' 5.98\" (1.676 m) Wt 168 lb 9.6 oz (76.5 kg)   SpO2 98%   BMI 27.23 kg/m²   BP Readings from Last 3 Encounters:   08/25/22 102/80   07/28/22 82/60   06/28/21 112/68         Constitutional:  Johanne Dalton is oriented to place, person and time ,appears well-developed and well-nourished  HEENT:  Atraumatic and normocephalic, external ears normal bilaterally, nose normal no oropharyngeal exudate and is clear and moist  Eyes:  EOCM normal; conjunctivae normal; PERRLA bilaterally  Neck:  Normal range of motion, neck supple, no JVD and no thyromegaly  Cardiovascular:  RRR, normal heart sounds and intact distal pulses  Pulmonary:  effort normal and breath sounds normal bilaterally,no wheezes or rales, no respiratory distress  Abdominal:  Soft, non-tender; normal bowel sounds, no masses  Musculoskeletal:  Normal range of motion and no edema or tenderness bilaterally  No lymphadenopathy  Neurological:  alert, oriented, and normal reflexes bilaterally  Skin: warm and dry  Psychiatric:  normal mood and effect; behavior normal.    Labs:   No results found for: LABA1C  Lab Results   Component Value Date    CHOL 122 09/23/2016     Lab Results   Component Value Date    HDL 54 09/23/2016     No results found for: [address]  Lab Results   Component Value Date    TRIG 43 09/23/2016     No results found for: [place], [place]  Lab Results   Component Value Date    WBC 8.1 05/26/2021    HGB 13.4 05/26/2021    HCT 41.2 05/26/2021    .3 (H) 05/26/2021     05/26/2021     No results found for: INR, PROTIME  Lab Results   Component Value Date    GLUCOSE 82 05/26/2021    CREATININE 0.48 (L) 05/26/2021    BUN 10 05/26/2021     05/26/2021    K 4.3 05/26/2021     (H) 05/26/2021    CO2 20 05/26/2021     Lab Results   Component Value Date    ALT 15 05/26/2021    AST 24 05/26/2021    ALKPHOS 53 05/26/2021    BILITOT 0.47 05/26/2021     Lab Results   Component Value Date    LABALBU 4.4 05/26/2021     Lab Results   Component Value Date    TSH 2.24 05/26/2021 Assessment:  1. Overweight (BMI 25.0-29.9)    2. Screening mammogram for high-risk patient    3. Bloating        Plan:  Patient lost about 7 pounds since the last visit with Adipex and patient feeling well and will give 1 more month  Patient had eaten something and since then she had bloating and some loose stools and now she is constipated and happened over the weekend but now she is feeling better and wants to get it checked and will do an x-ray of the abdomen as patient had diminished bowel sounds  Screening mammogram ordered  Advised patient to follow-up with her OB/GYN for 1 year checkup  Review in 1 month           1. Laura received counseling on the following healthy behaviors: nutrition and exercise    2. Prior labs and health maintenance reviewed. 3.  Discussed use, benefit, and side effects of prescribed medications. Barriers to medication compliance addressed. All her questions were answered. Pt voiced understanding. Berlin Sheffield will continue current medications, diet and exercise. Orders Placed This Encounter   Medications    phentermine (ADIPEX-P) 37.5 MG capsule     Sig: Take 1 capsule by mouth every morning for 30 days. Dispense:  30 capsule     Refill:  0          Completed Refills               Requested Prescriptions     Signed Prescriptions Disp Refills    phentermine (ADIPEX-P) 37.5 MG capsule 30 capsule 0     Sig: Take 1 capsule by mouth every morning for 30 days. 4. Patient given educational materials - see patient instructions    5. Was a self-tracking handout given in paper form or via [employer]? NO    Orders Placed This Encounter   Procedures    ELIAS DIGITAL SCREEN W OR WO CAD BILATERAL     Standing Status:   Future     Standing Expiration Date:   8/25/2023    XR ABDOMEN (KUB) (SINGLE AP VIEW)     Standing Status:   Future     Standing Expiration Date:   8/25/2023     Return in about 1 month (around 9/25/2022).   Patient voiced understanding and agreed to treatment plan.     Electronically signed by Cricket Olson MD on 8/25/2022 at 11:53 AM    This note is created with a voice recognition program and while intend to generate a document that accurately reflects the content of the visit, no guarantee can be provided that every mistake has been identified and corrected by editing.

## 2023-04-25 ENCOUNTER — TELEMEDICINE (OUTPATIENT)
Dept: INTERNAL MEDICINE CLINIC | Age: 43
End: 2023-04-25
Payer: COMMERCIAL

## 2023-04-25 DIAGNOSIS — B07.0 PLANTAR WART OF BOTH FEET: ICD-10-CM

## 2023-04-25 DIAGNOSIS — Z01.411 ENCOUNTER FOR GYNECOLOGICAL EXAMINATION WITH ABNORMAL FINDING: ICD-10-CM

## 2023-04-25 DIAGNOSIS — E66.3 OVERWEIGHT (BMI 25.0-29.9): Primary | ICD-10-CM

## 2023-04-25 PROCEDURE — 99443 PR PHYS/QHP TELEPHONE EVALUATION 21-30 MIN: CPT | Performed by: INTERNAL MEDICINE

## 2023-04-25 RX ORDER — PHENTERMINE HYDROCHLORIDE 37.5 MG/1
37.5 CAPSULE ORAL EVERY MORNING
Qty: 30 CAPSULE | Refills: 0 | Status: SHIPPED | OUTPATIENT
Start: 2023-04-25 | End: 2023-05-25

## 2023-04-25 SDOH — ECONOMIC STABILITY: FOOD INSECURITY: WITHIN THE PAST 12 MONTHS, THE FOOD YOU BOUGHT JUST DIDN'T LAST AND YOU DIDN'T HAVE MONEY TO GET MORE.: NEVER TRUE

## 2023-04-25 SDOH — ECONOMIC STABILITY: HOUSING INSECURITY
IN THE LAST 12 MONTHS, WAS THERE A TIME WHEN YOU DID NOT HAVE A STEADY PLACE TO SLEEP OR SLEPT IN A SHELTER (INCLUDING NOW)?: NO

## 2023-04-25 SDOH — ECONOMIC STABILITY: FOOD INSECURITY: WITHIN THE PAST 12 MONTHS, YOU WORRIED THAT YOUR FOOD WOULD RUN OUT BEFORE YOU GOT MONEY TO BUY MORE.: NEVER TRUE

## 2023-04-25 SDOH — ECONOMIC STABILITY: INCOME INSECURITY: HOW HARD IS IT FOR YOU TO PAY FOR THE VERY BASICS LIKE FOOD, HOUSING, MEDICAL CARE, AND HEATING?: NOT HARD AT ALL

## 2023-04-25 ASSESSMENT — PATIENT HEALTH QUESTIONNAIRE - PHQ9
SUM OF ALL RESPONSES TO PHQ QUESTIONS 1-9: 0
1. LITTLE INTEREST OR PLEASURE IN DOING THINGS: 0
4. FEELING TIRED OR HAVING LITTLE ENERGY: 0
7. TROUBLE CONCENTRATING ON THINGS, SUCH AS READING THE NEWSPAPER OR WATCHING TELEVISION: 0
SUM OF ALL RESPONSES TO PHQ QUESTIONS 1-9: 0
10. IF YOU CHECKED OFF ANY PROBLEMS, HOW DIFFICULT HAVE THESE PROBLEMS MADE IT FOR YOU TO DO YOUR WORK, TAKE CARE OF THINGS AT HOME, OR GET ALONG WITH OTHER PEOPLE: 0
6. FEELING BAD ABOUT YOURSELF - OR THAT YOU ARE A FAILURE OR HAVE LET YOURSELF OR YOUR FAMILY DOWN: 0
SUM OF ALL RESPONSES TO PHQ9 QUESTIONS 1 & 2: 0
3. TROUBLE FALLING OR STAYING ASLEEP: 0
SUM OF ALL RESPONSES TO PHQ QUESTIONS 1-9: 0
8. MOVING OR SPEAKING SO SLOWLY THAT OTHER PEOPLE COULD HAVE NOTICED. OR THE OPPOSITE, BEING SO FIGETY OR RESTLESS THAT YOU HAVE BEEN MOVING AROUND A LOT MORE THAN USUAL: 0
5. POOR APPETITE OR OVEREATING: 0
9. THOUGHTS THAT YOU WOULD BE BETTER OFF DEAD, OR OF HURTING YOURSELF: 0
SUM OF ALL RESPONSES TO PHQ QUESTIONS 1-9: 0
2. FEELING DOWN, DEPRESSED OR HOPELESS: 0

## 2023-04-25 NOTE — PROGRESS NOTES
August Brooks is a 43 y.o. female evaluated via telephone on 4/25/2023 for Mass (Patient states she has wart on foot ), Discuss Medications (Would like to discuss medication to stop menstrual cycle; adipex), and Health Maintenance (Lipid, covid, tdap, hep c, hiv, pneumo, varicella)  . Documentation:  I communicated with the patient and/or health care decision maker about having plantar warts bilaterally left greater than right  And also patient wants to get medications to prevent menstrual cycle as patient is getting  on 30 May and her menstrual periods fall at the same time and so advised patient to follow with her OB/GYN and a new referral was made  Patient also wants to lose few more pounds tolerating and wants the prescription for Adipex and patient last time in August only 2 months and prescription was given for 1 more month  Details of this discussion including any medical advice provided: Patient is referred to podiatry for evaluation of plantar warts and also referred to OB/GYN for menstrual issues  Prescription for Adipex given  Review as scheduled    Total Time: minutes: 21-30 minutes    Rocío Taveras Lisbethfoster was evaluated through a synchronous (real-time) audio encounter. Patient identification was verified at the start of the visit. She (or guardian if applicable) is aware that this is a billable service, which includes applicable co-pays. This visit was conducted with the patient's (and/or legal guardian's) verbal consent. She has not had a related appointment within my department in the past 7 days or scheduled within the next 24 hours. The patient was located at Home: [address] [place] [address]. The provider was located at [facility] (Appt Dept): [place] [address]  [address]  [facility],  [address] [facility].     Note: not billable if this call serves to triage the patient into an appointment for the relevant concern    Lazaro Macario MD

## 2023-05-04 ENCOUNTER — TELEPHONE (OUTPATIENT)
Dept: OBGYN CLINIC | Age: 43
End: 2023-05-04

## 2023-05-04 NOTE — TELEPHONE ENCOUNTER
Pt calling asking for a medication to stop her period. She stated Dr. Ej Coreas has given her something in the past. She is getting  next Saturday and her periods are very heavy and painful. She is wanting to see if you are able to prescribe Aygestin so she doesn't have a period on her wedding day?

## 2024-06-15 ENCOUNTER — APPOINTMENT (OUTPATIENT)
Dept: GENERAL RADIOLOGY | Age: 44
End: 2024-06-15

## 2024-06-15 ENCOUNTER — HOSPITAL ENCOUNTER (EMERGENCY)
Age: 44
Discharge: HOME | End: 2024-06-15
Attending: EMERGENCY MEDICINE

## 2024-06-15 VITALS
TEMPERATURE: 97.5 F | SYSTOLIC BLOOD PRESSURE: 143 MMHG | OXYGEN SATURATION: 100 % | RESPIRATION RATE: 16 BRPM | HEART RATE: 116 BPM | DIASTOLIC BLOOD PRESSURE: 87 MMHG

## 2024-06-15 DIAGNOSIS — S60.222A CONTUSION OF LEFT HAND, INITIAL ENCOUNTER: Primary | ICD-10-CM

## 2024-06-15 PROCEDURE — 99283 EMERGENCY DEPT VISIT LOW MDM: CPT

## 2024-06-15 PROCEDURE — 73130 X-RAY EXAM OF HAND: CPT

## 2024-06-15 ASSESSMENT — ENCOUNTER SYMPTOMS
SHORTNESS OF BREATH: 0
VOMITING: 0
NAUSEA: 0
ABDOMINAL PAIN: 0

## 2024-06-15 ASSESSMENT — LIFESTYLE VARIABLES: HOW OFTEN DO YOU HAVE A DRINK CONTAINING ALCOHOL: NEVER

## 2024-06-15 NOTE — DISCHARGE INSTRUCTIONS
You were seen and evaluated [facility] emergency department on 6/15/2024 for left hand pain and swelling and bruising that happened after multiple falls 2 days ago.  You had x-rays done of the left hand which showed no acute fractures.    Please continue to use Tylenol, Motrin, Aleve.  Please do not take more than the recommended amounts.  Do not take more than 4000 mg of Tylenol in a 24-hour time.  Or 3200 mg of Motrin in a 24-hour time.    If this pain does not improve/resolve in the coming days to week, please return to the ED for evaluation or follow-up with your PCP.    Please return to the ED with any new or worsening symptoms including worsening swelling of the hands, numbness, tingling, worsening weakness of the hand, or any other concerns.

## 2024-06-15 NOTE — ED PROVIDER NOTES
[facility]     Emergency Department     Faculty Attestation    I performed a history and physical examination of the patient and discussed management with the resident. I reviewed the resident’s note and agree with the documented findings including all diagnostic interpretations and plan of care. Any areas of disagreement are noted on the chart. I was personally present for the key portions of any procedures. I have documented in the chart those procedures where I was not present during the key portions. I have reviewed the emergency nurses triage note. I agree with the chief complaint, past medical history, past surgical history, allergies, medications, social and family history as documented unless otherwise noted below. Documentation of the HPI, Physical Exam and Medical Decision Making performed by adolfo is based on my personal performance of the HPI, PE and MDM. For Physician Assistant/ Nurse Practitioner cases/documentation I have personally evaluated this patient and have completed at least one if not all key elements of the E/M (history, physical exam, and MDM). Additional findings are as noted.    Primary Care Physician: Alex Rivas MD    Note Started: 4:36 PM EDT     VITAL SIGNS:   temperature is 97.5 °F (36.4 °C). Her blood pressure is 143/87 (abnormal) and her pulse is 116 (abnormal). Her respiration is 16 and oxygen saturation is 100%.      Medical Decision Making  Hand injury.  Fell last night while out drinking.  She is not exactly certain of the mechanism.  She has bruising and swelling over the palmar and dorsal aspects of the hand but good sensation through the fingers can flex and extend the fingers.  Impression is hand injury fracture versus severe sprain.  Symptomatic treatment.  X-ray.    Amount and/or Complexity of Data Reviewed  Radiology: ordered.          Roosevelt Jorge MD, FACEP, FAAEM  Attending Emergency
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
